# Patient Record
Sex: FEMALE | Race: WHITE | NOT HISPANIC OR LATINO | Employment: FULL TIME | URBAN - METROPOLITAN AREA
[De-identification: names, ages, dates, MRNs, and addresses within clinical notes are randomized per-mention and may not be internally consistent; named-entity substitution may affect disease eponyms.]

---

## 2023-09-18 ENCOUNTER — OFFICE VISIT (OUTPATIENT)
Dept: URGENT CARE | Facility: CLINIC | Age: 38
End: 2023-09-18
Payer: COMMERCIAL

## 2023-09-18 VITALS
OXYGEN SATURATION: 99 % | RESPIRATION RATE: 20 BRPM | HEART RATE: 89 BPM | SYSTOLIC BLOOD PRESSURE: 130 MMHG | WEIGHT: 144 LBS | HEIGHT: 65 IN | BODY MASS INDEX: 23.99 KG/M2 | TEMPERATURE: 98 F | DIASTOLIC BLOOD PRESSURE: 80 MMHG

## 2023-09-18 DIAGNOSIS — J06.9 UPPER RESPIRATORY VIRUS: Primary | ICD-10-CM

## 2023-09-18 DIAGNOSIS — J02.9 SORETHROAT: ICD-10-CM

## 2023-09-18 LAB
CTP QC/QA: YES
CTP QC/QA: YES
MOLECULAR STREP A: NEGATIVE
SARS-COV-2 AG RESP QL IA.RAPID: NEGATIVE

## 2023-09-18 PROCEDURE — 87651 STREP A DNA AMP PROBE: CPT | Mod: QW,S$GLB,, | Performed by: NURSE PRACTITIONER

## 2023-09-18 PROCEDURE — 87811 SARS-COV-2 COVID19 W/OPTIC: CPT | Mod: QW,S$GLB,, | Performed by: NURSE PRACTITIONER

## 2023-09-18 PROCEDURE — 99203 PR OFFICE/OUTPT VISIT, NEW, LEVL III, 30-44 MIN: ICD-10-PCS | Mod: S$GLB,,, | Performed by: NURSE PRACTITIONER

## 2023-09-18 PROCEDURE — 87651 POCT STREP A MOLECULAR: ICD-10-PCS | Mod: QW,S$GLB,, | Performed by: NURSE PRACTITIONER

## 2023-09-18 PROCEDURE — 99203 OFFICE O/P NEW LOW 30 MIN: CPT | Mod: S$GLB,,, | Performed by: NURSE PRACTITIONER

## 2023-09-18 PROCEDURE — 87811 SARS CORONAVIRUS 2 ANTIGEN POCT, MANUAL READ: ICD-10-PCS | Mod: QW,S$GLB,, | Performed by: NURSE PRACTITIONER

## 2023-09-18 NOTE — PROGRESS NOTES
"Subjective:      Patient ID: Josephine Cruz is a 38 y.o. female.    Vitals:  height is 5' 5" (1.651 m) and weight is 65.3 kg (144 lb). Her oral temperature is 98 °F (36.7 °C). Her blood pressure is 130/80 and her pulse is 89. Her respiration is 20 and oxygen saturation is 99%.     Chief Complaint: Sore Throat    This is a 38 y.o. female who presents today with a chief complaint of  headaches, sore throat, earaches, stomach upset, sneezing, congestion ,fever 101.2.symptoms started today denies body aches or chills, denies cough, wheezing or shortness of breath, denies nausea, vomiting, diarrhea or abdominal pain, denies chest pain or dizziness positional lightheadedness, denies trouble swallowing, denies loss of taste or smell, or any other symptoms         Sinus Problem  This is a new problem. The current episode started today. The problem has been gradually worsening since onset. The maximum temperature recorded prior to her arrival was 101 - 101.9 F. The fever has been present for Less than 1 day. Her pain is at a severity of 3/10. The pain is mild. Associated symptoms include congestion, ear pain, headaches, sinus pressure, sneezing, a sore throat and swollen glands. Pertinent negatives include no chills or coughing. Past treatments include nothing. The treatment provided no relief.       Constitution: Positive for fever. Negative for chills.   HENT:  Positive for ear pain, congestion, sinus pressure and sore throat.    Respiratory:  Negative for cough.    Allergic/Immunologic: Positive for sneezing.   Neurological:  Positive for headaches.      Objective:     Physical Exam   Constitutional: She is oriented to person, place, and time. She appears well-developed. She is cooperative.  Non-toxic appearance. She does not appear ill. No distress.   HENT:   Head: Normocephalic and atraumatic.   Ears:   Right Ear: Hearing, tympanic membrane, external ear and ear canal normal.   Left Ear: Hearing, tympanic membrane, " external ear and ear canal normal.   Nose: Nose normal. No mucosal edema, rhinorrhea or nasal deformity. No epistaxis. Right sinus exhibits no maxillary sinus tenderness and no frontal sinus tenderness. Left sinus exhibits no maxillary sinus tenderness and no frontal sinus tenderness.   Mouth/Throat: Uvula is midline, oropharynx is clear and moist and mucous membranes are normal. No trismus in the jaw. Normal dentition. No uvula swelling. No oropharyngeal exudate, posterior oropharyngeal edema, posterior oropharyngeal erythema, tonsillar abscesses or cobblestoning.   Eyes: Conjunctivae and lids are normal. No scleral icterus.   Neck: Trachea normal and phonation normal. Neck supple. No edema present. No erythema present. No neck rigidity present.   Cardiovascular: Normal rate, regular rhythm, normal heart sounds and normal pulses.   Pulmonary/Chest: Effort normal and breath sounds normal. No respiratory distress. She has no decreased breath sounds. She has no rhonchi.   Abdominal: Normal appearance.   Musculoskeletal: Normal range of motion.         General: No deformity. Normal range of motion.   Lymphadenopathy:     She has no cervical adenopathy.   Neurological: She is alert and oriented to person, place, and time. She exhibits normal muscle tone. Coordination normal.   Skin: Skin is warm, dry, intact, not diaphoretic and not pale.   Psychiatric: Her speech is normal and behavior is normal. Judgment and thought content normal.   Nursing note and vitals reviewed.    Results for orders placed or performed in visit on 09/18/23   SARS Coronavirus 2 Antigen, POCT Manual Read   Result Value Ref Range    SARS Coronavirus 2 Antigen Negative Negative     Acceptable Yes    POCT Strep A, Molecular   Result Value Ref Range    Molecular Strep A, POC Negative Negative     Acceptable Yes          Patient in no acute distress.  Vitals reassuring.  Discussed results/diagnosis/plan in depth with  patient in clinic. Strict precautions given to patient to monitor for worsening signs and symptoms. Advised to follow up with primary.All questions answered. Strict ER precautions given. If your symptoms worsens or fail to improve you should go to the Emergency Room. Discharge and follow-up instructions given verbally/printed. Discharge and follow-up instructions discussed with the patient who expressed understanding and willingness to comply with my recommendations.Patient voiced understanding and in agreement with current treatment plan.     Please be advised this text was dictated with [employer] software and may contain errors due to translation.    Assessment:     1. Upper respiratory virus    2. Sorethroat        Plan:       Upper respiratory virus    Sorethroat  -     SARS Coronavirus 2 Antigen, POCT Manual Read  -     POCT Strep A, Molecular    Other orders  -     (Magic mouthwash) 1:1:1 diphenhydrAMINE(Benadryl) 12.5mg/5ml liq, aluminum & magnesium hydroxide-simethicone (Maalox), LIDOcaine viscous 2%; Swish and spit 10 mLs every 4 (four) hours as needed (sore throat). for sore throat  Dispense: 90 mL; Refill: 0                  Patient Instructions   PLEASE READ YOUR DISCHARGE INSTRUCTIONS ENTIRELY AS IT CONTAINS IMPORTANT INFORMATION.      Please drink plenty of fluids.    Please get plenty of rest.    Please return here or go to the Emergency Department for any concerns or worsening of condition.    Please take an over the counter antihistamine medication (allegra/Claritin/Zyrtec) of your choice as directed.    Try an over the counter decongestant like Mucinex D or Sudafed. You buy this behind the pharmacy counter    If you do have Hypertension or palpitations, it is safe to take Coricidin HBP for relief of sinus symptoms.    If not allergic, please take over the counter Tylenol (Acetaminophen) and/or Motrin (Ibuprofen) as directed for control of pain and/or fever.  Please follow up with your primary care  doctor or specialist as needed.    Sore throat recommendations: Warm fluids, warm salt water gargles, throat lozenges, tea, honey, soup, rest, hydration.    Use over the counter flonase: one spray each nostril twice daily OR two sprays each nostril once daily.     If you  smoke, please stop smoking.      Please return or see your primary care doctor if you develop new or worsening symptoms.     Please arrange follow up with your primary medical clinic as soon as possible. You must understand that you've received an Urgent Care treatment only and that you may be released before all of your medical problems are known or treated. You, the patient, will arrange for follow up as instructed. If your symptoms worsen or fail to improve you should go to the Emergency Room.

## 2024-06-18 ENCOUNTER — OFFICE VISIT (OUTPATIENT)
Dept: INTERNAL MEDICINE | Facility: CLINIC | Age: 39
End: 2024-06-18
Payer: COMMERCIAL

## 2024-06-18 ENCOUNTER — TELEPHONE (OUTPATIENT)
Dept: ORTHOPEDICS | Facility: CLINIC | Age: 39
End: 2024-06-18
Payer: COMMERCIAL

## 2024-06-18 ENCOUNTER — LAB VISIT (OUTPATIENT)
Dept: LAB | Facility: HOSPITAL | Age: 39
End: 2024-06-18
Payer: COMMERCIAL

## 2024-06-18 VITALS
DIASTOLIC BLOOD PRESSURE: 80 MMHG | SYSTOLIC BLOOD PRESSURE: 114 MMHG | OXYGEN SATURATION: 97 % | BODY MASS INDEX: 24.68 KG/M2 | WEIGHT: 148.13 LBS | HEIGHT: 65 IN | HEART RATE: 72 BPM

## 2024-06-18 DIAGNOSIS — Z87.898 HISTORY OF BREAST LUMP: ICD-10-CM

## 2024-06-18 DIAGNOSIS — H81.20 VESTIBULAR NEURITIS, UNSPECIFIED LATERALITY: ICD-10-CM

## 2024-06-18 DIAGNOSIS — Z85.41 HISTORY OF CERVICAL CANCER: ICD-10-CM

## 2024-06-18 DIAGNOSIS — M51.36 DDD (DEGENERATIVE DISC DISEASE), LUMBAR: Primary | ICD-10-CM

## 2024-06-18 DIAGNOSIS — M54.16 LUMBAR RADICULOPATHY, CHRONIC: ICD-10-CM

## 2024-06-18 DIAGNOSIS — M54.50 LUMBAR PAIN: ICD-10-CM

## 2024-06-18 DIAGNOSIS — Z00.00 ANNUAL PHYSICAL EXAM: Primary | ICD-10-CM

## 2024-06-18 DIAGNOSIS — M54.9 DORSALGIA, UNSPECIFIED: ICD-10-CM

## 2024-06-18 DIAGNOSIS — D22.9 MULTIPLE ATYPICAL SKIN MOLES: ICD-10-CM

## 2024-06-18 DIAGNOSIS — Z00.00 ANNUAL PHYSICAL EXAM: ICD-10-CM

## 2024-06-18 LAB
ALBUMIN SERPL BCP-MCNC: 4.2 G/DL (ref 3.5–5.2)
ALP SERPL-CCNC: 42 U/L (ref 55–135)
ALT SERPL W/O P-5'-P-CCNC: 11 U/L (ref 10–44)
ANION GAP SERPL CALC-SCNC: 9 MMOL/L (ref 8–16)
AST SERPL-CCNC: 16 U/L (ref 10–40)
BASOPHILS # BLD AUTO: 0.05 K/UL (ref 0–0.2)
BASOPHILS NFR BLD: 0.6 % (ref 0–1.9)
BILIRUB SERPL-MCNC: 0.4 MG/DL (ref 0.1–1)
BUN SERPL-MCNC: 17 MG/DL (ref 6–20)
CALCIUM SERPL-MCNC: 9.2 MG/DL (ref 8.7–10.5)
CHLORIDE SERPL-SCNC: 105 MMOL/L (ref 95–110)
CHOLEST SERPL-MCNC: 178 MG/DL (ref 120–199)
CHOLEST/HDLC SERPL: 3.1 {RATIO} (ref 2–5)
CO2 SERPL-SCNC: 23 MMOL/L (ref 23–29)
CREAT SERPL-MCNC: 0.8 MG/DL (ref 0.5–1.4)
DIFFERENTIAL METHOD BLD: ABNORMAL
EOSINOPHIL # BLD AUTO: 0.4 K/UL (ref 0–0.5)
EOSINOPHIL NFR BLD: 4.4 % (ref 0–8)
ERYTHROCYTE [DISTWIDTH] IN BLOOD BY AUTOMATED COUNT: 12.5 % (ref 11.5–14.5)
EST. GFR  (NO RACE VARIABLE): >60 ML/MIN/1.73 M^2
ESTIMATED AVG GLUCOSE: 97 MG/DL (ref 68–131)
GLUCOSE SERPL-MCNC: 88 MG/DL (ref 70–110)
HBA1C MFR BLD: 5 % (ref 4–5.6)
HCT VFR BLD AUTO: 41.4 % (ref 37–48.5)
HCV AB SERPL QL IA: NORMAL
HDLC SERPL-MCNC: 58 MG/DL (ref 40–75)
HDLC SERPL: 32.6 % (ref 20–50)
HGB BLD-MCNC: 13.5 G/DL (ref 12–16)
HIV 1+2 AB+HIV1 P24 AG SERPL QL IA: NORMAL
IMM GRANULOCYTES # BLD AUTO: 0.03 K/UL (ref 0–0.04)
IMM GRANULOCYTES NFR BLD AUTO: 0.3 % (ref 0–0.5)
LDLC SERPL CALC-MCNC: 106.8 MG/DL (ref 63–159)
LYMPHOCYTES # BLD AUTO: 1.9 K/UL (ref 1–4.8)
LYMPHOCYTES NFR BLD: 21.2 % (ref 18–48)
MCH RBC QN AUTO: 31.5 PG (ref 27–31)
MCHC RBC AUTO-ENTMCNC: 32.6 G/DL (ref 32–36)
MCV RBC AUTO: 97 FL (ref 82–98)
MONOCYTES # BLD AUTO: 0.9 K/UL (ref 0.3–1)
MONOCYTES NFR BLD: 10 % (ref 4–15)
NEUTROPHILS # BLD AUTO: 5.7 K/UL (ref 1.8–7.7)
NEUTROPHILS NFR BLD: 63.5 % (ref 38–73)
NONHDLC SERPL-MCNC: 120 MG/DL
NRBC BLD-RTO: 0 /100 WBC
PLATELET # BLD AUTO: 275 K/UL (ref 150–450)
PMV BLD AUTO: 11.5 FL (ref 9.2–12.9)
POTASSIUM SERPL-SCNC: 4.7 MMOL/L (ref 3.5–5.1)
PROT SERPL-MCNC: 7.4 G/DL (ref 6–8.4)
RBC # BLD AUTO: 4.29 M/UL (ref 4–5.4)
SODIUM SERPL-SCNC: 137 MMOL/L (ref 136–145)
TRIGL SERPL-MCNC: 66 MG/DL (ref 30–150)
TSH SERPL DL<=0.005 MIU/L-ACNC: 1.39 UIU/ML (ref 0.4–4)
WBC # BLD AUTO: 8.93 K/UL (ref 3.9–12.7)

## 2024-06-18 PROCEDURE — 80053 COMPREHEN METABOLIC PANEL: CPT | Performed by: PHYSICIAN ASSISTANT

## 2024-06-18 PROCEDURE — 36415 COLL VENOUS BLD VENIPUNCTURE: CPT | Performed by: PHYSICIAN ASSISTANT

## 2024-06-18 PROCEDURE — 3008F BODY MASS INDEX DOCD: CPT | Mod: CPTII,S$GLB,, | Performed by: PHYSICIAN ASSISTANT

## 2024-06-18 PROCEDURE — 99999 PR PBB SHADOW E&M-EST. PATIENT-LVL V: CPT | Mod: PBBFAC,,, | Performed by: PHYSICIAN ASSISTANT

## 2024-06-18 PROCEDURE — 90715 TDAP VACCINE 7 YRS/> IM: CPT | Mod: S$GLB,,, | Performed by: PHYSICIAN ASSISTANT

## 2024-06-18 PROCEDURE — 83036 HEMOGLOBIN GLYCOSYLATED A1C: CPT | Performed by: PHYSICIAN ASSISTANT

## 2024-06-18 PROCEDURE — 3074F SYST BP LT 130 MM HG: CPT | Mod: CPTII,S$GLB,, | Performed by: PHYSICIAN ASSISTANT

## 2024-06-18 PROCEDURE — 3079F DIAST BP 80-89 MM HG: CPT | Mod: CPTII,S$GLB,, | Performed by: PHYSICIAN ASSISTANT

## 2024-06-18 PROCEDURE — 99385 PREV VISIT NEW AGE 18-39: CPT | Mod: 25,S$GLB,, | Performed by: PHYSICIAN ASSISTANT

## 2024-06-18 PROCEDURE — 84443 ASSAY THYROID STIM HORMONE: CPT | Performed by: PHYSICIAN ASSISTANT

## 2024-06-18 PROCEDURE — 80061 LIPID PANEL: CPT | Performed by: PHYSICIAN ASSISTANT

## 2024-06-18 PROCEDURE — 90471 IMMUNIZATION ADMIN: CPT | Mod: S$GLB,,, | Performed by: PHYSICIAN ASSISTANT

## 2024-06-18 PROCEDURE — 1159F MED LIST DOCD IN RCRD: CPT | Mod: CPTII,S$GLB,, | Performed by: PHYSICIAN ASSISTANT

## 2024-06-18 PROCEDURE — 85025 COMPLETE CBC W/AUTO DIFF WBC: CPT | Performed by: PHYSICIAN ASSISTANT

## 2024-06-18 PROCEDURE — 87389 HIV-1 AG W/HIV-1&-2 AB AG IA: CPT | Performed by: PHYSICIAN ASSISTANT

## 2024-06-18 PROCEDURE — 86803 HEPATITIS C AB TEST: CPT | Performed by: PHYSICIAN ASSISTANT

## 2024-06-18 PROCEDURE — 3044F HG A1C LEVEL LT 7.0%: CPT | Mod: CPTII,S$GLB,, | Performed by: PHYSICIAN ASSISTANT

## 2024-06-18 RX ORDER — GABAPENTIN 100 MG/1
100 CAPSULE ORAL 3 TIMES DAILY
Qty: 90 CAPSULE | Refills: 11 | Status: SHIPPED | OUTPATIENT
Start: 2024-06-18 | End: 2025-06-18

## 2024-06-18 RX ORDER — MELOXICAM 15 MG/1
15 TABLET ORAL DAILY
Qty: 14 TABLET | Refills: 0 | Status: SHIPPED | OUTPATIENT
Start: 2024-06-18 | End: 2024-07-02

## 2024-06-18 RX ORDER — MECLIZINE HYDROCHLORIDE 25 MG/1
25 TABLET ORAL 3 TIMES DAILY PRN
Qty: 30 TABLET | Refills: 1 | Status: SHIPPED | OUTPATIENT
Start: 2024-06-18

## 2024-06-18 NOTE — PROGRESS NOTES
Internal Medicine Annual Exam       CHIEF COMPLAINT     The patient, Josephine Cruz, who is a 39 y.o. female presents for an annual exam.    HPI     PCP is No, Primary Doctor, patient is new to me.     She presents for annual exam, she will meet with Dr. Alvarez later this year to establish care.      Has chronic lower back pain - worse in the past few weeks   Has x-ray from 10/31/2022 that shows degenerative changes and limbus vertebrae -needs to establish with ortho spine and pain mgmt here at [facility]. She reports that for the past 2-3 weeks she has been in a lot of pain. Pain worse with sitting for long periods and with changing positions. She denies bladder or bowel incontinence. She has no history of back or spin surgery. However, she has had evals with spine surgery in the past.     She has vestibular neuritis that she manages with PRN meds as follows: Meclizine, Klonopin nightly, Meloxicam  Ibuprofen 800 mg. She admits that she was unaware that she should not take ibuprofen and meloxicam together.      History of cerivcal ca - s/p leep and conization with clearance documented - follows with [facility]YN - Dr. Joseph, her recently screenings are clear.     History of Left breast bx- benign remote, will start annual mammograms next year at age 40.     She reports multiple moles that she wound like to have a dermatologist evaluate. She is requesting derm referral.     Family history:   Maternal GM -breast and colon Ca   Maternal GF - CABG with CAD   Mother with arthritis - osteoarthritis, carrier for muscular dystrophy         Past Medical History:  No past medical history on file.    No past surgical history on file.     No family history on file.     Social History     Socioeconomic History    Marital status:    Tobacco Use    Smoking status: Never    Smokeless tobacco: Never     Social Determinants of Health     Financial Resource Strain: Medium Risk (6/17/2024)    Overall Financial Resource Strain  (CARDIA)     Difficulty of Paying Living Expenses: Somewhat hard   Food Insecurity: No Food Insecurity (6/17/2024)    Hunger Vital Sign     Worried About Running Out of Food in the Last Year: Never true     Ran Out of Food in the Last Year: Never true   Physical Activity: Sufficiently Active (6/17/2024)    Exercise Vital Sign     Days of Exercise per Week: 5 days     Minutes of Exercise per Session: 40 min   Stress: Stress Concern Present (6/17/2024)    [place] citizen [place] [employer] - Occupational Stress Questionnaire     Feeling of Stress : Very much   Housing Stability: Unknown (6/17/2024)    Housing Stability Vital Sign     Unable to Pay for Housing in the Last Year: No        Social History     Tobacco Use   Smoking Status Never   Smokeless Tobacco Never        Allergies as of 06/18/2024    (No Known Allergies)          Home Medications:  Prior to Admission medications    Medication Sig Start Date End Date Taking? Authorizing Provider   (Magic mouthwash) 1:1:1 diphenhydrAMINE(Benadryl) 12.5mg/5ml liq, aluminum & magnesium hydroxide-simethicone (Maalox), LIDOcaine viscous 2% Swish and spit 10 mLs every 4 (four) hours as needed (sore throat). for sore throat 9/18/23   Rathor, Qurratulain, NP       Review of Systems:  Review of Systems   Constitutional:  Negative for chills and fever.   HENT:  Negative for sore throat and trouble swallowing.    Eyes:  Negative for visual disturbance.   Respiratory:  Negative for cough and shortness of breath.    Cardiovascular:  Negative for chest pain.   Gastrointestinal:  Negative for abdominal pain, constipation, diarrhea, nausea and vomiting.   Genitourinary:  Negative for dysuria and flank pain.   Musculoskeletal:  Positive for back pain. Negative for neck pain and neck stiffness.   Skin:  Negative for rash.   Neurological:  Negative for dizziness, syncope, weakness and headaches.   Psychiatric/Behavioral:  Negative for confusion.        Health Maintainence:  "  Immunizations:  Health Maintenance         Date Due Completion Date    Hepatitis C Screening Never done ---    Cervical Cancer Screening Never done ---    Lipid Panel Never done ---    HIV Screening Never done ---    TETANUS VACCINE Never done ---    COVID-19 Vaccine (1 - 2023-24 season) Never done ---    Influenza Vaccine (Season Ended) 09/01/2024 ---             PHYSICAL EXAM     /80   Pulse 72   Ht 5' 5" (1.651 m)   Wt 67.2 kg (148 lb 2.4 oz)   SpO2 97%   BMI 24.65 kg/m²  Body mass index is 24.65 kg/m².    Physical Exam  Vitals and nursing note reviewed.   Constitutional:       Appearance: Normal appearance.      Comments: Healthy appearing female in NAD or apparent pain. She makes good eye contact, speaks in clear full sentences and ambulates with ease.          HENT:      Head: Normocephalic and atraumatic.      Nose: Nose normal.      Mouth/Throat:      Pharynx: Oropharynx is clear.   Eyes:      Conjunctiva/sclera: Conjunctivae normal.   Cardiovascular:      Rate and Rhythm: Normal rate and regular rhythm.      Pulses: Normal pulses.   Pulmonary:      Effort: No respiratory distress.   Abdominal:      Tenderness: There is no abdominal tenderness.   Musculoskeletal:         General: Normal range of motion.      Cervical back: No rigidity.      Comments: No C T or L midline bony TTP crepitus or step-offs    Skin:     General: Skin is warm and dry.      Capillary Refill: Capillary refill takes less than 2 seconds.      Findings: No rash.   Neurological:      General: No focal deficit present.      Mental Status: She is alert.      Gait: Gait normal.   Psychiatric:         Mood and Affect: Mood normal.         LABS     No results found for: "LABA1C", "HGBA1C"  CMP  No results found for: "NA", "K", "CL", "CO2", "GLU", "BUN", "CREATININE", "CALCIUM", "PROT", "ALBUMIN", "BILITOT", "ALKPHOS", "AST", "ALT", "ANIONGAP", "ESTGFRAFRICA", "EGFRNONAA"  No results found for: "WBC", "HGB", "HCT", "MCV", "PLT"  No " "results found for: "CHOL"  No results found for: "HDL"  No results found for: "LDLCALC"  No results found for: "TRIG"  No results found for: "CHOLHDL"  No results found for: "TSH", "R8WQJNP", "C9CINVT", "THYROIDAB"    ASSESSMENT/PLAN     Josephine Cruz is a 39 y.o. female    Josephine was seen today for annual exam and back pain. Will get annual labs and will also refer to PT, ortho and pain clinic. Will get order Lumbar MRI as recommended on outside imaging. Recommend start trial of gabapentin. Low threshold to start steroid dose pack for refractory pain.     Diagnoses and all orders for this visit:    Annual physical exam  -     CBC Auto Differential; Future  -     Comprehensive Metabolic Panel; Future  -     Hemoglobin A1C; Future  -     Lipid Panel; Future  -     TSH; Future  -     HIV 1/2 Ag/Ab (4th Gen); Future  -     HEPATITIS C ANTIBODY; Future    Lumbar pain  -     Ambulatory referral/consult to Physical/Occupational Therapy; Future  -     Ambulatory referral/consult to Pain Clinic; Future  -     Ambulatory referral/consult to Orthopedics; Future    Dorsalgia, unspecified  -     Ambulatory referral/consult to Physical/Occupational Therapy; Future  -     Ambulatory referral/consult to Pain Clinic; Future  -     Ambulatory referral/consult to Orthopedics; Future  -     MRI Lumbar Spine Without Contrast; Future    Lumbar radiculopathy, chronic  -     Ambulatory referral/consult to Physical/Occupational Therapy; Future  -     Ambulatory referral/consult to Pain Clinic; Future  -     Ambulatory referral/consult to Orthopedics; Future  -     MRI Lumbar Spine Without Contrast; Future    Multiple atypical skin moles  -     Ambulatory referral/consult to Dermatology; Future    History of cervical cancer    History of breast lump    Vestibular neuritis, unspecified laterality    Other orders  -     gabapentin (NEURONTIN) 100 MG capsule; Take 1 capsule (100 mg total) by mouth 3 (three) times daily.  -     meclizine " (ANTIVERT) 25 mg tablet; Take 1 tablet (25 mg total) by mouth 3 (three) times daily as needed for Dizziness.  -     meloxicam (MOBIC) 15 MG tablet; Take 1 tablet (15 mg total) by mouth once daily. for 14 days  -     Tdap (BOOSTRIX) vaccine injection 0.5 mL      Tabatha Greene PA-C  Department of Internal Medicine - [facility]   11:05 AM

## 2024-06-18 NOTE — TELEPHONE ENCOUNTER
I called patient twice to speak to her. I did not leave a message the first time I left a message on the second attempt. I will cancel her appointment for today and schedule it for after she gets her MRI. Please call when you get this message.

## 2024-06-25 ENCOUNTER — TELEPHONE (OUTPATIENT)
Dept: SPINE | Facility: CLINIC | Age: 39
End: 2024-06-25
Payer: COMMERCIAL

## 2024-06-27 ENCOUNTER — HOSPITAL ENCOUNTER (OUTPATIENT)
Dept: RADIOLOGY | Facility: OTHER | Age: 39
Discharge: HOME | End: 2024-06-27
Attending: STUDENT IN AN ORGANIZED HEALTH CARE EDUCATION/TRAINING PROGRAM
Payer: COMMERCIAL

## 2024-06-27 ENCOUNTER — OFFICE VISIT (OUTPATIENT)
Dept: SPINE | Facility: CLINIC | Age: 39
End: 2024-06-27
Payer: COMMERCIAL

## 2024-06-27 ENCOUNTER — PATIENT MESSAGE (OUTPATIENT)
Dept: INTERNAL MEDICINE | Facility: CLINIC | Age: 39
End: 2024-06-27
Payer: COMMERCIAL

## 2024-06-27 VITALS
RESPIRATION RATE: 12 BRPM | HEART RATE: 77 BPM | SYSTOLIC BLOOD PRESSURE: 126 MMHG | WEIGHT: 145.81 LBS | OXYGEN SATURATION: 100 % | DIASTOLIC BLOOD PRESSURE: 76 MMHG | BODY MASS INDEX: 24.29 KG/M2 | HEIGHT: 65 IN

## 2024-06-27 DIAGNOSIS — M54.16 LUMBAR RADICULOPATHY, CHRONIC: ICD-10-CM

## 2024-06-27 DIAGNOSIS — M54.2 NECK PAIN, CHRONIC: ICD-10-CM

## 2024-06-27 DIAGNOSIS — G89.29 NECK PAIN, CHRONIC: Primary | ICD-10-CM

## 2024-06-27 DIAGNOSIS — G89.29 NECK PAIN, CHRONIC: ICD-10-CM

## 2024-06-27 DIAGNOSIS — M54.50 LUMBAR PAIN: ICD-10-CM

## 2024-06-27 DIAGNOSIS — M54.2 NECK PAIN, CHRONIC: Primary | ICD-10-CM

## 2024-06-27 DIAGNOSIS — M54.9 DORSALGIA, UNSPECIFIED: ICD-10-CM

## 2024-06-27 PROCEDURE — 3008F BODY MASS INDEX DOCD: CPT | Mod: CPTII,S$GLB,, | Performed by: STUDENT IN AN ORGANIZED HEALTH CARE EDUCATION/TRAINING PROGRAM

## 2024-06-27 PROCEDURE — 72040 X-RAY EXAM NECK SPINE 2-3 VW: CPT | Mod: TC,FY

## 2024-06-27 PROCEDURE — 3074F SYST BP LT 130 MM HG: CPT | Mod: CPTII,S$GLB,, | Performed by: STUDENT IN AN ORGANIZED HEALTH CARE EDUCATION/TRAINING PROGRAM

## 2024-06-27 PROCEDURE — 3078F DIAST BP <80 MM HG: CPT | Mod: CPTII,S$GLB,, | Performed by: STUDENT IN AN ORGANIZED HEALTH CARE EDUCATION/TRAINING PROGRAM

## 2024-06-27 PROCEDURE — 1160F RVW MEDS BY RX/DR IN RCRD: CPT | Mod: CPTII,S$GLB,, | Performed by: STUDENT IN AN ORGANIZED HEALTH CARE EDUCATION/TRAINING PROGRAM

## 2024-06-27 PROCEDURE — 3044F HG A1C LEVEL LT 7.0%: CPT | Mod: CPTII,S$GLB,, | Performed by: STUDENT IN AN ORGANIZED HEALTH CARE EDUCATION/TRAINING PROGRAM

## 2024-06-27 PROCEDURE — 99999 PR PBB SHADOW E&M-EST. PATIENT-LVL IV: CPT | Mod: PBBFAC,,, | Performed by: STUDENT IN AN ORGANIZED HEALTH CARE EDUCATION/TRAINING PROGRAM

## 2024-06-27 PROCEDURE — 99204 OFFICE O/P NEW MOD 45 MIN: CPT | Mod: S$GLB,,, | Performed by: STUDENT IN AN ORGANIZED HEALTH CARE EDUCATION/TRAINING PROGRAM

## 2024-06-27 PROCEDURE — 72040 X-RAY EXAM NECK SPINE 2-3 VW: CPT | Mod: 26,,, | Performed by: RADIOLOGY

## 2024-06-27 PROCEDURE — 1159F MED LIST DOCD IN RCRD: CPT | Mod: CPTII,S$GLB,, | Performed by: STUDENT IN AN ORGANIZED HEALTH CARE EDUCATION/TRAINING PROGRAM

## 2024-06-27 NOTE — PROGRESS NOTES
Chronic Pain - New Consult    Referring Physician: Tabatha Greene, JOSE    Chief Complaint:   Chief Complaint   Patient presents with    Back Pain    Low-back Pain          SUBJECTIVE:    Josephine Cruz presents to the clinic for the evaluation of lower back and bilateral lower extremity pain. The pain started over 10 years ago following no inciting event and symptoms have been worsening.The pain is located in the lower back area and radiates to the bilateral feet (prior sciatica, now just tingling calves/feet).  The pain is described as  solid, aching, occasionally sharp (causes her to use a cane)  and is rated as 2/10. The pain is rated with a score of  2/10 on the BEST day and a score of 9/10 on the WORST day.  Symptoms interfere with daily activity and sleeping. The pain is exacerbated by Coughing/Sneezing, Getting out of bed/chair, and kids coming to hug.  The pain is mitigated by inversion table (hurts at first, but alleviates pain for about 20 minutes). The patient reports 4 hours of uninterrupted sleep per night.    Patient denies urinary incontinence, bowel incontinence, and significant motor weakness.  She admits to vestibular neuritis (vertigo worsens with pain).  She also reports neck pain, but feels the back pain is worse.    Physical Therapy/Home Exercise: yes, saw PT 3 years ago.      Pain Disability Index Review:      6/27/2024     1:08 PM   Last 3 PDI Scores   Pain Disability Index (PDI) 42       Pain Medications:   - gabapentin (just started a week ago, but hasn't taken for several days)   - meloxicam (not taking as she's had good days)     report:  Reviewed and consistent with medication use as prescribed.    Pain Procedures:   None    Imaging:   X-ray, Lumbosacral, 2 or 3 Views  CLINICAL INDICATION  Back pain  COMPARISON  No relevant imaging examinations are available for review.  PROCEDURE DETAILS  AP, lateral and lumbosacral views  FINDINGS  The lumbar spine demonstrates normal  alignment and vertebral body height.  No fracture or  dislocation. Multilevel degenerative changes of the spine are present with degenerative disc  disease, marginal osteophytes and facet joint arthropathy, most apparent at L4-L5, L5-S1. Limbus  vertebrae favored over avulsion fracture at the anterior superior aspect of the L5 of vertebral  margin with corticated wedge-shaped ossific density.  Dextroscoliosis. The soft tissues are normal.  IMPRESSION  1.  Limbus vertebrae favored over avulsion fracture at the anterior superior margin of the L5  vertebral body. MRI lumbar spine would be helpful for further evaluation confirmation.  2.  Degenerative changes lumbar spine  Signature  Electronically Signed:   Jackie Dobbins M.D. on 10-, 02:04 PM  Jackie Dobbins  10- 02:04 PM    No past medical history on file.  History reviewed. No pertinent surgical history.  Social History     Socioeconomic History    Marital status:    Tobacco Use    Smoking status: Never    Smokeless tobacco: Never     Social Determinants of Health     Financial Resource Strain: Medium Risk (6/17/2024)    Overall Financial Resource Strain (CARDIA)     Difficulty of Paying Living Expenses: Somewhat hard   Food Insecurity: No Food Insecurity (6/17/2024)    Hunger Vital Sign     Worried About Running Out of Food in the Last Year: Never true     Ran Out of Food in the Last Year: Never true   Physical Activity: Sufficiently Active (6/17/2024)    Exercise Vital Sign     Days of Exercise per Week: 5 days     Minutes of Exercise per Session: 40 min   Stress: Stress Concern Present (6/17/2024)    [employer] - Occupational Stress Questionnaire     Feeling of Stress : Very much   Housing Stability: Unknown (6/17/2024)    Housing Stability Vital Sign     Unable to Pay for Housing in the Last Year: No     No family history on file.    Review of patient's allergies indicates:  No Known Allergies    Current  "Outpatient Medications   Medication Sig    gabapentin (NEURONTIN) 100 MG capsule Take 1 capsule (100 mg total) by mouth 3 (three) times daily.    meclizine (ANTIVERT) 25 mg tablet Take 1 tablet (25 mg total) by mouth 3 (three) times daily as needed for Dizziness.    meloxicam (MOBIC) 15 MG tablet Take 1 tablet (15 mg total) by mouth once daily. for 14 days     No current facility-administered medications for this visit.       REVIEW OF SYSTEMS:    GENERAL:  current fevers or chills, recent use of antibiotics denies.  HEENT:  History of migraines/headaches: reports history of migraines, History of major throat surgery: denies  RESPIRATORY:  History of home oxygen or pulmonary hypertension/severe breathing dysfunction: denies  CARDIOVASCULAR:  Hx of palpitations/rhythm problems: denies Hx of Heart Attacks/Surgery: denies  GI:  Recent abdominal discomfort or recent change in bowel habits denies  MUSCULOSKELETAL:  See HPI.  SKIN:  unhealed wounds or rashes: denies  PSYCH: major psychiatric history or recent psychosocial stressors reports anxiety and periodic depression (3-4 days every few months)  HEMATOLOGY/LYMPHOLOGY:  Hx of prolonged bleeding, Hx of Blood thinner usage: denies  NEURO:   history of seizures, strokes, chronic/old weakness (such as paralysis or paresis of any body part): denies  All other reviewed and negative other than HPI.    OBJECTIVE:    /76 (BP Location: Left arm, Patient Position: Sitting, BP Method: Large (Automatic))   Pulse 77   Resp 12   Ht 5' 5" (1.651 m)   Wt 66.2 kg (145 lb 13.4 oz)   SpO2 100%   BMI 24.27 kg/m²     PHYSICAL EXAMINATION:  General appearance: Well appearing, in no acute distress, alert and appropriately communicative.  Psych:  Mood and affect appropriate.  Skin: Skin color, texture, turgor normal, no rashes or lesions, in both upper and lower body for exposed skin.  Head/face:  Atraumatic, normocephalic.  Neck: pain to palpation, pain to " flexion/extension/lateral bend  Cor: regular rate  Pulm: non-labored breathing  GI: Abdomen non-distended and non-tender.  Back: Straight leg raising in the sitting and supine positions is positive to radicular pain on the right. No pain to palpation over the spine or paraspinal muscles. Pain with flexion > extension, pain worse on the left. + [place]  Extremities: No deformities, significant lymphedema, or skin discoloration. No significant open wounds. No major amputations.  Musculoskeletal: hip, sacroiliac and knee provocative maneuvers are negative. Bilateral upper and lower extremity strength is normal and symmetric.  No atrophy or tone abnormalities are noted.  Neuro: Bilateral upper and lower extremity coordination and muscle stretch reflexes abnormalities noted: none.  Cross and/or Clonus: negative; loss of sensation to light touch: increased on the right lateral lower leg compared to left  Gait: normal    CMP  Sodium   Date Value Ref Range Status   06/18/2024 137 136 - 145 mmol/L Final     Potassium   Date Value Ref Range Status   06/18/2024 4.7 3.5 - 5.1 mmol/L Final     Chloride   Date Value Ref Range Status   06/18/2024 105 95 - 110 mmol/L Final     CO2   Date Value Ref Range Status   06/18/2024 23 23 - 29 mmol/L Final     Glucose   Date Value Ref Range Status   06/18/2024 88 70 - 110 mg/dL Final     BUN   Date Value Ref Range Status   06/18/2024 17 6 - 20 mg/dL Final     Creatinine   Date Value Ref Range Status   06/18/2024 0.8 0.5 - 1.4 mg/dL Final     Calcium   Date Value Ref Range Status   06/18/2024 9.2 8.7 - 10.5 mg/dL Final     Total Protein   Date Value Ref Range Status   06/18/2024 7.4 6.0 - 8.4 g/dL Final     Albumin   Date Value Ref Range Status   06/18/2024 4.2 3.5 - 5.2 g/dL Final     Total Bilirubin   Date Value Ref Range Status   06/18/2024 0.4 0.1 - 1.0 mg/dL Final     Comment:     For infants and newborns, interpretation of results should be based  on gestational age, weight and in  agreement with clinical  observations.    Premature Infant recommended reference ranges:  Up to 24 hours.............<8.0 mg/dL  Up to 48 hours............<12.0 mg/dL  3-5 days..................<15.0 mg/dL  6-29 days.................<15.0 mg/dL       Alkaline Phosphatase   Date Value Ref Range Status   06/18/2024 42 (L) 55 - 135 U/L Final     AST   Date Value Ref Range Status   06/18/2024 16 10 - 40 U/L Final     ALT   Date Value Ref Range Status   06/18/2024 11 10 - 44 U/L Final     Anion Gap   Date Value Ref Range Status   06/18/2024 9 8 - 16 mmol/L Final     eGFR   Date Value Ref Range Status   06/18/2024 >60.0 >60 mL/min/1.73 m^2 Final       ASSESSMENT: 39 y.o. year old female with chronic pain, consistent with:    1. Neck pain, chronic  X-Ray Cervical Spine 2 or 3 Views      2. Lumbar pain  Ambulatory referral/consult to Pain Clinic      3. Dorsalgia, unspecified  Ambulatory referral/consult to Pain Clinic      4. Lumbar radiculopathy, chronic  Ambulatory referral/consult to Pain Clinic        IMPRESSION: Josephine Cruz presents today for chronic lower back pain, as well as neck pain. History and physical exam are consistent with axial lower back pain/vertebrogenic low back pain.  My independent interpretation of the imaging is consistent with lumbar degenerative disc disease with lumbus vertebra at L5.  We will start with basic imaging and conservative management (physical therapy and non-narcotic medications). If their pain persists despite conservative measures, we will consider advanced imaging and/or interventions in an effort to give them additional relief for their pain.    PLAN:   - I have stressed the importance of physical activity and a home exercise plan to help with pain and improve health.  - Patient can continue with medications for now since they are providing benefits, using them appropriately, and without side effects.  - she is set to start physical therapy next week  - xray cervical  spine with flexion/extension  - ok to take tylenol 1000mg every 8 hours as needed; < 3000mg total in a day  - ok to use voltaren 1% topical for myofascial pain; do not use over open sores/wounds  - given [facility] spine conditioning and Claudia program (neck) handout; instructed to participate in regular home exercises at least 3 times a week for 15 minutes at a time.  They can do this while waiting for physical therapy to start.  - RTC in 2 - 3 months to discuss interventions, if applicable at that time. Of note, she may benefit from bilateral L5/S1 transforaminal epidural injection based on xrays and symptoms  - Counseled patient regarding the importance of activity modification and physical therapy.    The above plan and management options were discussed at length with patient. Patient is in agreement with the above and verbalized understanding. It will be communicated with the referring physician via electronic record, fax, or mail.    Irina Ann  06/27/2024

## 2024-06-28 NOTE — TELEPHONE ENCOUNTER
Pt inquiring if her upcoming MRI should be her entire spine rather than just lumbar due to recent finding from pain management Dr. Ann -- please advise and I will reach out to pt for update

## 2024-06-29 ENCOUNTER — HOSPITAL ENCOUNTER (OUTPATIENT)
Dept: RADIOLOGY | Facility: HOSPITAL | Age: 39
Discharge: HOME | End: 2024-06-29
Attending: PHYSICIAN ASSISTANT
Payer: COMMERCIAL

## 2024-06-29 DIAGNOSIS — M54.16 LUMBAR RADICULOPATHY, CHRONIC: ICD-10-CM

## 2024-06-29 DIAGNOSIS — M54.9 DORSALGIA, UNSPECIFIED: ICD-10-CM

## 2024-06-29 DIAGNOSIS — M51.36 DDD (DEGENERATIVE DISC DISEASE), LUMBAR: ICD-10-CM

## 2024-06-29 PROCEDURE — 72110 X-RAY EXAM L-2 SPINE 4/>VWS: CPT | Mod: 26,,, | Performed by: RADIOLOGY

## 2024-06-29 PROCEDURE — 72148 MRI LUMBAR SPINE W/O DYE: CPT | Mod: 26,,, | Performed by: RADIOLOGY

## 2024-06-29 PROCEDURE — 72148 MRI LUMBAR SPINE W/O DYE: CPT | Mod: TC

## 2024-06-29 PROCEDURE — 72110 X-RAY EXAM L-2 SPINE 4/>VWS: CPT | Mod: TC,FY

## 2024-07-01 ENCOUNTER — OFFICE VISIT (OUTPATIENT)
Dept: ORTHOPEDICS | Facility: CLINIC | Age: 39
End: 2024-07-01
Payer: COMMERCIAL

## 2024-07-01 VITALS — HEIGHT: 65 IN | BODY MASS INDEX: 24.96 KG/M2 | WEIGHT: 149.81 LBS

## 2024-07-01 DIAGNOSIS — M54.16 LUMBAR RADICULOPATHY: Primary | ICD-10-CM

## 2024-07-01 PROCEDURE — 99213 OFFICE O/P EST LOW 20 MIN: CPT | Mod: S$GLB,,, | Performed by: ORTHOPAEDIC SURGERY

## 2024-07-01 PROCEDURE — 1159F MED LIST DOCD IN RCRD: CPT | Mod: CPTII,S$GLB,, | Performed by: ORTHOPAEDIC SURGERY

## 2024-07-01 PROCEDURE — 3008F BODY MASS INDEX DOCD: CPT | Mod: CPTII,S$GLB,, | Performed by: ORTHOPAEDIC SURGERY

## 2024-07-01 PROCEDURE — 99999 PR PBB SHADOW E&M-EST. PATIENT-LVL II: CPT | Mod: PBBFAC,,, | Performed by: ORTHOPAEDIC SURGERY

## 2024-07-01 PROCEDURE — 3044F HG A1C LEVEL LT 7.0%: CPT | Mod: CPTII,S$GLB,, | Performed by: ORTHOPAEDIC SURGERY

## 2024-07-03 ENCOUNTER — PATIENT MESSAGE (OUTPATIENT)
Dept: ORTHOPEDICS | Facility: CLINIC | Age: 39
End: 2024-07-03
Payer: COMMERCIAL

## 2024-07-05 RX ORDER — METHYLPREDNISOLONE 4 MG/1
TABLET ORAL
Qty: 1 EACH | Refills: 0 | Status: SHIPPED | OUTPATIENT
Start: 2024-07-05 | End: 2024-07-26

## 2024-07-15 ENCOUNTER — TELEPHONE (OUTPATIENT)
Dept: PAIN MEDICINE | Facility: CLINIC | Age: 39
End: 2024-07-15
Payer: COMMERCIAL

## 2024-07-15 DIAGNOSIS — M54.16 LUMBAR RADICULOPATHY: Primary | ICD-10-CM

## 2024-07-15 NOTE — TELEPHONE ENCOUNTER
----- Message from Darcy Hodgson PA-C sent at 7/15/2024  8:33 AM CDT -----  Regarding: Order for KERI CEDENO    Patient Name: KERI CEDENO([id number])  Sex: Female  : 1985      PCP: SUHAIL, PRIMARY DOCTOR    Center: None     Types of orders made on 07/15/2024: Procedure Request    Order Date:7/15/2024  Ordering User:DARCY HODGSON [[id number]]  Encounter Provider:Darcy Hodgson PA-C [[id number]]  Authorizing   Provider: Darcy Hodgson PA-C [[id number]]  Supervising Provider:HUSEYIN TREVINO [[id number]]  Type of Supervision:Supervision Required  Department:[facility][[id number]]    Common Order Information  Procedure -> Transforaminal Injection (Specify level and laterality) Cmt: bilat             L5-S1    Order Specific Information  Order: Procedure Order to Pain Management [Custom: [id number]]  Order #:          [id number]  252Qty: 1 FUTURE    Priority: Routine  Class: Clinic Performed    Future Order Information      Expires on:07/15/2025            Expected by:07/15/2024                   Associated Diagnoses      M54.16 Lumbar radiculopathy      Facility Name: -> [facility]           Priority: Routine  Class: Clinic Performed    Future Order Information      Expires on:07/15/2025            Expected   by:07/15/2024                   Associated Diagnoses      M54.16 Lumbar radiculopathy      Procedure -> Transforaminal Injection (Specify level and laterality) Cmt:                 bilat L5-S1        Facility Name: -> [facility]

## 2024-07-15 NOTE — PROGRESS NOTES
Spoke with pt virtually. Pt was last seen 7/3/24 and continues to have low back and bilateral leg pain. Pt has tried home exercises and PT with no relief. Pain is 8/10. I provided the patient with a home exercise program. It is the [facility] spine conditioning program. Exercises include head rolls, kneeling back extension, sitting rotation stretch, modified seated side straddle, knee to chest, bird dog, plank, modified seated plank, hip bridges, abdominal bracing, and abdominal crunch. Pt completes each exercise daily for one hour with worsening of pain. MRI demonstrates advanced degenerative changes in the lower lumbar spine at the L4-L5 and L5-S1 levels with central disc protrusions resulting in marked narrowing of the lateral recesses.  Right foraminal disc protrusion at the L2-L3 level resulting in moderate narrowing of the right neural foramina at the L2-L3 level. . Will order bilateral L5-S1 TFESI with pain management. Pt will fu if pain persists.

## 2024-07-16 ENCOUNTER — CLINICAL SUPPORT (OUTPATIENT)
Dept: REHABILITATION | Facility: HOSPITAL | Age: 39
End: 2024-07-16
Payer: COMMERCIAL

## 2024-07-16 DIAGNOSIS — M54.16 LUMBAR RADICULOPATHY, CHRONIC: ICD-10-CM

## 2024-07-16 DIAGNOSIS — M54.50 CHRONIC MIDLINE LOW BACK PAIN, UNSPECIFIED WHETHER SCIATICA PRESENT: Primary | ICD-10-CM

## 2024-07-16 DIAGNOSIS — M54.50 LUMBAR PAIN: ICD-10-CM

## 2024-07-16 DIAGNOSIS — G89.29 CHRONIC MIDLINE LOW BACK PAIN, UNSPECIFIED WHETHER SCIATICA PRESENT: Primary | ICD-10-CM

## 2024-07-16 DIAGNOSIS — M54.9 DORSALGIA, UNSPECIFIED: ICD-10-CM

## 2024-07-16 PROCEDURE — 97140 MANUAL THERAPY 1/> REGIONS: CPT

## 2024-07-16 PROCEDURE — 97110 THERAPEUTIC EXERCISES: CPT

## 2024-07-16 PROCEDURE — 97161 PT EVAL LOW COMPLEX 20 MIN: CPT

## 2024-07-16 NOTE — PROGRESS NOTES
"[facility]   Physical Therapy Initial Evaluation      Name: Josephine Cruz  Clinic Number: [id number]    Therapy Diagnosis:   Encounter Diagnoses   Name Primary?    Lumbar pain     Dorsalgia, unspecified     Lumbar radiculopathy, chronic     Chronic midline low back pain, unspecified whether sciatica present Yes        Physician: Tabatha Greene, JOSE    Physician Orders: PT Eval and Treat   Medical Diagnosis from Referral:   M54.50 (ICD-10-CM) - Low back pain, unspecified   M54.9 (ICD-10-CM) - Dorsalgia, unspecified   M54.16 (ICD-10-CM) - Radiculopathy, lumbar region     Evaluation Date: 7/16/2024  Authorization Period Expiration: 12/31/2024  Plan of Care Expiration: 9/16/2024  Progress Note Due: 8/16/2024  Date of Surgery: None  Visit # / Visits authorized: 1/ 1   FOTO 1:  Precautions: Standard   Time In: 5:00 pm  Time Out: 6:00 pm  Total Billable Time: 60 minutes    Subjective   Josephine Cruz presents to the clinic for the evaluation of lower back and bilateral lower extremity pain. The pain started over 10 years ago following no inciting event and symptoms have been worsening.The pain is located in the lower back area and radiates to the bilateral feet (prior sciatica, now just tingling calves/feet).  The pain is described as  solid, aching, occasionally sharp (causes her to use a cane)  and is rated as 2/10. The pain is rated with a score of  2/10 on the BEST day and a score of 9/10 on the WORST day.  Symptoms interfere with daily activity and sleeping. The pain is exacerbated by Coughing/Sneezing, Getting out of bed/chair, and kids coming to hug.  The pain is mitigated by inversion table (hurts at first, but alleviates pain for about 20 minutes). The patient reports 4 hours of uninterrupted sleep per night.  When the pain flares up, it triggers her "vestibular neuritis."  Date of onset: 10+ years (gradual onset, flares up every month or so)  History of current " "condition - KERI reports: "the ache never went away, but the pain went down for a few months. She has gone to chiropractors, multiple rounds of physical therapy, and has an inversion table at her home, and repeated self mobilizations but everything is short term relief. The "harsh pain" started [id number]. She endorses numbness and tingling, sometimes both legs, but is near-constant in the right foot, with the "ache" feeling more to the left of the lower back, and the sharper pain being in the center all along the back.  Falls: None  Imaging: MRI studies: EXAMINATION:  MRI LUMBAR SPINE WITHOUT CONTRAST     CLINICAL HISTORY:  Low back pain, symptoms persist with > 6wks conservative treatment;Lumbar radiculopathy, symptoms persist with conservative treatment; Dorsalgia, unspecified     TECHNIQUE:  Multiplanar, multisequence MR images were acquired from the thoracolumbar junction to the sacrum without the administration of contrast.     COMPARISON:  X-ray lumbar spine dated 06/29/2024.     FINDINGS:  There is minimal retrolisthesis of L5 on S1.  The remainder of the lumbar alignment is within normal limits.     There is a probable chronic injury along the anterior superior endplate of the L5 vertebral body.  The vertebral body heights are otherwise maintained.  The bone marrow signal is within normal limits.     The conus ends at the level of L1.  The cauda equina nerve roots are within normal limits.     There is hypertrophy of the posterior elements.  There is multilevel disc desiccation.  Evaluation of the individual disc levels reveals the following:     L1-L2, normal.     L2-L3, there is a disc bulge along with facet hypertrophy and ligamentum flavum hypertrophy.  There is a right foraminal disc protrusion.  The spinal canal is within normal limits.  There is moderate right-sided neural foraminal narrowing.  The left neural foramina is unremarkable.     L3-L4, there is diffuse disc bulge along with facet " "hypertrophy and ligamentum flavum hypertrophy.  The spinal canal is within normal limits.  The neural foramina is unremarkable.     L4-L5, there is diffuse disc bulge along with facet hypertrophy and ligamentum flavum hypertrophy.  There is superimposed central disc protrusion.  There is marked narrowing of both lateral recesses.  There is mild spinal canal narrowing.  There is mild bilateral neural foraminal narrowing.     L5-S1, there is diffuse disc bulge along with facet hypertrophy and ligamentum flavum hypertrophy.  There is a posterior annular fissure.  There is central disc protrusion.  There is mild spinal canal narrowing.  There is marked narrowing the lateral recesses.  There is mild bilateral neural foraminal narrowing.     The paraspinal soft tissues are within normal limits.     Impression:     Advanced degenerative changes in the lower lumbar spine at the L4-L5 and L5-S1 levels with central disc protrusions resulting in marked narrowing of the lateral recesses.  No significant central spinal canal narrowing.     Right foraminal disc protrusion at the L2-L3 level resulting in moderate narrowing of the right neural foramina at the L2-L3 level.     Additional multilevel degenerative changes as above.    Prior Therapy: Yes; multiple times for the same complaint  Social History: Lives with their family  Occupation: Teacher   Prior Level of Function: Independent  Current Level of Function: Independent but feels as if the back pain "runs her life." She would be more involved in children's sports and less time on the sideline and being in bed.    Pain:  Current 2/10  Location: Left-Sided Lower Back Pain; Right-Sided Tingling    Description: Aching  Aggravating Factors: Slouching/Relaxed Position, Increase in Numbness When Walking,   Easing Factors: Posture (straight), Hot Bath, *"Nothing completely eases the ache.     Medical History:   No past medical history on file.    Surgical History:   Josephine Lawson" "Anthony  has a past surgical history that includes injection, spine, lumbosacral, transforaminal approach (Bilateral, 7/26/2024).    Medications:   Josephine has a current medication list which includes the following prescription(s): gabapentin and meclizine.    Allergies:   Review of patient's allergies indicates:  No Known Allergies   Objective       Right Left   Biceps Brachii -           -   Brachioradialis -          -   Triceps Brachii -          -   Patellar Tendon 2+          2+   Molly's Tendon 2+          2+     Sensation: Increased on the left with S2 and L5, with increased sensation in the right lower extremity with every other dermatomal level.     Right Left   Hip Flexors 5/5 5/5   Knee Extension 5/5 5/5   Knee Flexion 5/5 5/5   Dorsiflexion 5/5 5/5   Inversion (L4) 5/5 5/5   Eversion (L5) 5/5 5/5     Lumbar AROM Pain/Dysfunction with movement   Flexion  (45-50 degrees) Full degrees (+) pain upon return from flexion   Extension  (25 degrees) Full degrees Sacral flexion maintained, hinge-point at lower lumbar spine   Right Side Bending  (20 degrees) Full degrees -   Left Side Bending  (20 degrees) Full degrees -   Right Rotation   Full degrees -   Left Rotation Full degrees -     Special Testing  Robbi Test    Slump Test +for parasthesia on the right, and left pain on the back     Intake Outcome Measure for FOTO Lumbar Survey    Therapist reviewed FOTO scores for Josephine Cruz on 7/16/2024.   FOTO report - see Media section or FOTO account episode details.    Intake Score: 46%         Treatment     Total Treatment time (time-based codes) separate from Evaluation: 16 minutes     JOSEPHINE received the treatments listed below:      therapeutic exercises to develop strength, endurance, ROM, flexibility, and posture for 8 minutes including:  Slump Sliders, 20x5" each side    manual therapy techniques: Joint mobilizations were applied for 8 minutes, including:  Grade V Thoracic Manipulation    Patient " Education and Home Exercises     Education provided:   -Findings of evaluation and examination, and affect of these on plan for treatment  -Prognosis and expectations  -Role of PT and team-centered care for patient  -Home exercise program and expectations of therapy    Written Home Exercises Provided: yes. Exercises were reviewed and JOSEPHINE was able to demonstrate them prior to the end of the session.  JOSEPHINE demonstrated good  understanding of the education provided. See EMR under Patient Instructions for exercises provided during therapy sessions.    Assessment     Josephine is a 39 y.o. female referred to outpatient Physical Therapy with a medical diagnosis of   M54.50 (ICD-10-CM) - Low back pain, unspecified   M54.9 (ICD-10-CM) - Dorsalgia, unspecified   M54.16 (ICD-10-CM) - Radiculopathy, lumbar region   Patient presents with limitations in higher level functional tasks and activities 2/2 limitation in prolonged sitting and standing or walking due to limitations in thoracic mobility, adverse neural tension, pain with prolonged extension and flexion, and chronicity of pain. Josephine will benefit from a customized physical therapy plan of care  to address the aforementioned impairments in an effort to improve human function and quality of life.      Patient prognosis is Fair.   Patient will benefit from skilled outpatient Physical Therapy to address the deficits stated above and in the chart below, provide patient /family education, and to maximize patientt's level of independence.     Plan of care discussed with patient: Yes  Patient's spiritual, cultural and educational needs considered and patient is agreeable to the plan of care and goals as stated below:     Anticipated Barriers for therapy: Chronicity, Pain Beliefs, Start Back 8    Medical Necessity is demonstrated by the following  History  Co-morbidities and personal factors that may impact the plan of care [] LOW: no personal factors / co-morbidities  [x]  MODERATE: 1-2 personal factors / co-morbidities  [] HIGH: 3+ personal factors / co-morbidities    Moderate / High Support Documentation:   Co-morbidities affecting plan of care: See Medical History    Personal Factors:   age  attitudes     Examination  Body Structures and Functions, activity limitations and participation restrictions that may impact the plan of care [x] LOW: addressing 1-2 elements  [] MODERATE: 3+ elements  [] HIGH: 4+ elements (please support below)    Moderate / High Support Documentation: Low     Clinical Presentation [x] LOW: stable  [] MODERATE: Evolving  [] HIGH: Unstable     Decision Making/ Complexity Score: low       Goals:  Short Term Goals: 2 weeks   1.) Patient will demonstrate independence in compliance and technique of home exercise program provided as per teach-back method of assessment.  2.) Patient will demonstrate a 5-point improvement as per FOTO score to demonstrate improvements in perceived functional ability.    Long Term Goals: 4 weeks   1.) Patient will demonstrate independence in compliance and technique of home exercise program provided as per teach-back method of assessment.  2.) Patient will demonstrate a 10-point improvement as per FOTO score to demonstrate improvements in perceived functional ability.  3.) Patient will demonstrate a <5 score on Start Back to improve pain beliefs.  Plan     Plan of care Certification: 7/16/2024 to 9/16/2024.    Outpatient Physical Therapy 1 times weekly for 6 weeks to include the following interventions: Manual Therapy, Moist Heat/ Ice, Neuromuscular Re-ed, Patient Education, Self Care, Therapeutic Activities, and Therapeutic Exercise.     Breana Ramirez PT DPT  Board Certified in Orthopedic Physical Therapy          Physician's Signature: _________________________________________ Date: ________________

## 2024-07-17 ENCOUNTER — PATIENT MESSAGE (OUTPATIENT)
Dept: SPINE | Facility: CLINIC | Age: 39
End: 2024-07-17
Payer: COMMERCIAL

## 2024-07-19 ENCOUNTER — TELEPHONE (OUTPATIENT)
Dept: PAIN MEDICINE | Facility: CLINIC | Age: 39
End: 2024-07-19
Payer: COMMERCIAL

## 2024-07-24 NOTE — PRE-PROCEDURE INSTRUCTIONS
Patient reviewed on 7/24/2024.  Okay to proceed at [place]. The following pre-procedure instructions and arrival time have been reviewed with patient via phone and sent to patient portal for review.  Patient verbalized an understanding.  Pt to be accompanied by Kolea dariana of procedure as responsible .     Dear KERI ,     You are scheduled for a pain procedure on 7/26/24 with Dr. Ann, please report to [facility]   [address] [facility].     Please park in the lot in front of the building and enter at the main entrance. Proceed to the second floor for registration.     Arrival time: 7 am     Anesthesia fasting instructions:   IV sedation. You should not eat for 8 hours and can only drink clear liquids (water or black coffee without cream/sugar) up until 2 hours before your scheduled time.  You CANNOT drive yourself and must have a .     *Refrain from drinking any alcohol  *Please insure that you have pre-planned your ride home IF YOU ARE to have sedation of any kind You CANNOT drive yourself home.    *You may not leave alone in an uber, taxi, etc. IF YOU HAVE received sedating medications by mouth or by vein  *You must be discharged to a responsible adult.   *Please remain under adult supervision for 24 hours if you had any form of sedation.      If possible, please have your visitor &/or ride home stay during your visit.   The surgeon should speak with your visitors after your procedure.  All visitors must be 18 years of age or older. Please limit your visitors to max of 2 people.  Please plan on being here for roughly 2-3 hours.   If you are on blood thinners, you need to follow the anticoagulation instructions that had been discussed previously.     IF you were told to stop your blood thinners, this is how long you should generally hold some of the more common ones.  Remember that stopping blood thinners is only necessary for certain procedures. If you are unsure of your  instructions, please call us.      You should take any medications that you routinely take for blood pressure, heart medications, thyroid, cholesterol, etc with small sips of water.   If you are a diabetic, do not take your medication if you will be fasting, but bring it with you.   Please plan on being here for roughly 2 hours.   HOLD vitamins and supplements the morning of your procedure.  HOLD any Anticoagulants (ex. Aspirin, goody or BC powder, Coumadin, Eliquis, Heparin, Plavix, Lovenox, Xarelto, etc.) for a total of 5 days  HOLD any non-insulin injections until after procedure (Ozempic, Mounjaro, Trulicity, Victoza, Byetta, Wegovy, Adlyxin, etc) (you should have been instructed to hold for a 7 day period)     Shower the night before and the morning of your procedure with antibacterial soap (ex. Dial)   Please do not use antibacterial soap to wash your face. Use your regular face soap.  Do not apply any products to your skin nor your hair after you shower the morning of your procedure.         Products include: lotions, oils, ointments, creams, gels, powders, lotion, deodorant, make-up, perfume/cologne, after shave and sunscreen.        No contacts. Bring glasses if necessary.  If you have dentures, please bring a case.  Please leave all jewelry and valuables at home.  Wear loose comfortable clothes (preferably an button up shirt), All patients will need to be placed in a gown for procedure.     ---If you start to feel sick (fever, chills, coughing, sore throat, etc) or start on or have been taking any antibiotics, please contact your doctor's office at [phone] your procedure would have to be rescheduled.      Please reply to this message as receipt of delivery.     Thanks,  Catina, LPN [facility]  Pre-Admit Testing

## 2024-07-25 ENCOUNTER — CLINICAL SUPPORT (OUTPATIENT)
Dept: REHABILITATION | Facility: HOSPITAL | Age: 39
End: 2024-07-25
Payer: COMMERCIAL

## 2024-07-25 DIAGNOSIS — M54.9 DORSALGIA, UNSPECIFIED: ICD-10-CM

## 2024-07-25 DIAGNOSIS — M54.16 LUMBAR RADICULOPATHY, CHRONIC: ICD-10-CM

## 2024-07-25 DIAGNOSIS — G89.29 CHRONIC MIDLINE LOW BACK PAIN, UNSPECIFIED WHETHER SCIATICA PRESENT: ICD-10-CM

## 2024-07-25 DIAGNOSIS — M54.50 CHRONIC MIDLINE LOW BACK PAIN, UNSPECIFIED WHETHER SCIATICA PRESENT: ICD-10-CM

## 2024-07-25 DIAGNOSIS — M54.50 LUMBAR PAIN: Primary | ICD-10-CM

## 2024-07-25 PROCEDURE — 97112 NEUROMUSCULAR REEDUCATION: CPT

## 2024-07-25 PROCEDURE — 97110 THERAPEUTIC EXERCISES: CPT

## 2024-07-25 PROCEDURE — 97140 MANUAL THERAPY 1/> REGIONS: CPT

## 2024-07-25 NOTE — PROGRESS NOTES
Physical Therapy Daily Treatment Note     Name: Josephine Lawson [facility]  Clinic Number: [id number]    Therapy Diagnosis:   Encounter Diagnoses   Name Primary?    Lumbar pain Yes    Chronic midline low back pain, unspecified whether sciatica present     Lumbar radiculopathy, chronic     Dorsalgia, unspecified      Physician: Tabatha Greene PA-C    Visit Date: 7/25/2024  Physician Orders: PT Eval and Treat   Medical Diagnosis from Referral:   M54.50 (ICD-10-CM) - Low back pain, unspecified   M54.9 (ICD-10-CM) - Dorsalgia, unspecified   M54.16 (ICD-10-CM) - Radiculopathy, lumbar region      Evaluation Date: 7/16/2024  Authorization Period Expiration: 12/31/2024  Plan of Care Expiration: 9/16/2024  Progress Note Due: 8/16/2024  Date of Surgery: None  Visit # / Visits authorized: 1/ 20   FOTO 1:  Precautions: Standard     Time In: 5:00 pm  Time Out: 5:55 pm  Total Billable Time: 55 minutes  Precautions: Standard    Subjective   Pt reports: she is not really having a great deal of pain at this time..  She was compliant with home exercise program.  Response to previous treatment: Initial Evaluation  Functional change: Ongoing  Pain: 0/10  Location: Bilateral Lower Back   Objective   Slump Test: (+)    JOSEPHINE received therapeutic exercises to develop strength, endurance, ROM, flexibility, and posture for 18 minutes including:  Upright Bike, 8 minutes  Supine Thoracic Extension Mobilizations, with foam roller, 20x  Seated Sciatic Slump Gliders, 20x each side  Objective measures taken (see above)      JOSEPHINE received the following manual therapy techniques: Joint mobilizations were applied for 10 minutes, including:  Grade V Thoracic Manipulation  Grade V L5-S1 Gapping Mobilization, right side      JOSEPHINE participated in neuromuscular re-education activities to improve: Balance, Coordination, Kinesthetic, Sense, Proprioception, and Posture for 27 minutes. The following activities were included:  TrA Activation with  "Biofeedback Cuff, 15x10" hold  TrA Activation with Biofeedback Cuff + Bent Knee Fall Out, 10x each side, rest break in between repetitions  Clamshells with Half Plank    JOSEPHINE participated in dynamic functional therapeutic activities to improve functional performance for 0  minutes, includin  Home Exercises Provided and Patient Education Provided     Education provided:   - Home exercise program     Written Home Exercises Provided: Patient instructed to cont prior HEP.  Exercises were reviewed and JOSEPHINE was able to demonstrate them prior to the end of the session.  JOSEPHINE demonstrated good  understanding of the education provided.     See EMR under Patient Instructions for exercises provided prior visit.    Assessment   Josephine demonstrates a (+) Slump Test again this visit, without any pain noted during lumbar active cardinal plane range of motion. She demonstrates good ability to immediately initiate correct activation of TrA. Patient to undergo MAINOR on .    JOSEPHINE Is progressing well towards her goals.   Pt prognosis is Fair.     Pt will continue to benefit from skilled outpatient physical therapy to address the deficits listed in the problem list box on initial evaluation, provide pt/family education and to maximize pt's level of independence in the home and community environment.     Pt's spiritual, cultural and educational needs considered and pt agreeable to plan of care and goals.     Anticipated barriers to physical therapy: : Chronicity, Pain Beliefs, Start Back 8    Goals: Goals:  Short Term Goals: 2 weeks   1.) Patient will demonstrate independence in compliance and technique of home exercise program provided as per teach-back method of assessment.  2.) Patient will demonstrate a 5-point improvement as per FOTO score to demonstrate improvements in perceived functional ability.     Long Term Goals: 4 weeks   1.) Patient will demonstrate independence in compliance and technique of home exercise " program provided as per teach-back method of assessment.  2.) Patient will demonstrate a 10-point improvement as per FOTO score to demonstrate improvements in perceived functional ability.  3.) Patient will demonstrate a <5 score on Start Back to improve pain beliefs.    Plan     Continue to progress with lumbopelvic stabilization, pain science education and cardiovascular endurance exercise of moderate intensity.    Breana Ramirez, PT, DPT  Board Certified in Orthopedic Physical Therapy

## 2024-07-26 ENCOUNTER — HOSPITAL ENCOUNTER (OUTPATIENT)
Facility: HOSPITAL | Age: 39
Discharge: HOME | End: 2024-07-26
Attending: STUDENT IN AN ORGANIZED HEALTH CARE EDUCATION/TRAINING PROGRAM | Admitting: STUDENT IN AN ORGANIZED HEALTH CARE EDUCATION/TRAINING PROGRAM
Payer: COMMERCIAL

## 2024-07-26 VITALS
HEART RATE: 72 BPM | BODY MASS INDEX: 24.66 KG/M2 | TEMPERATURE: 99 F | WEIGHT: 148 LBS | DIASTOLIC BLOOD PRESSURE: 82 MMHG | SYSTOLIC BLOOD PRESSURE: 120 MMHG | HEIGHT: 65 IN | RESPIRATION RATE: 16 BRPM | OXYGEN SATURATION: 100 %

## 2024-07-26 DIAGNOSIS — G89.29 CHRONIC PAIN: ICD-10-CM

## 2024-07-26 DIAGNOSIS — M54.16 LUMBAR RADICULOPATHY: Primary | ICD-10-CM

## 2024-07-26 LAB
B-HCG UR QL: NEGATIVE
CTP QC/QA: YES

## 2024-07-26 PROCEDURE — 64483 NJX AA&/STRD TFRM EPI L/S 1: CPT | Mod: 50 | Performed by: STUDENT IN AN ORGANIZED HEALTH CARE EDUCATION/TRAINING PROGRAM

## 2024-07-26 PROCEDURE — 25500020 PHARM REV CODE 255: Performed by: STUDENT IN AN ORGANIZED HEALTH CARE EDUCATION/TRAINING PROGRAM

## 2024-07-26 PROCEDURE — 25000003 PHARM REV CODE 250: Performed by: STUDENT IN AN ORGANIZED HEALTH CARE EDUCATION/TRAINING PROGRAM

## 2024-07-26 PROCEDURE — 63600175 PHARM REV CODE 636 W HCPCS: Performed by: STUDENT IN AN ORGANIZED HEALTH CARE EDUCATION/TRAINING PROGRAM

## 2024-07-26 PROCEDURE — 81025 URINE PREGNANCY TEST: CPT | Performed by: STUDENT IN AN ORGANIZED HEALTH CARE EDUCATION/TRAINING PROGRAM

## 2024-07-26 PROCEDURE — 64483 NJX AA&/STRD TFRM EPI L/S 1: CPT | Mod: 50,,, | Performed by: STUDENT IN AN ORGANIZED HEALTH CARE EDUCATION/TRAINING PROGRAM

## 2024-07-26 PROCEDURE — 99152 MOD SED SAME PHYS/QHP 5/>YRS: CPT | Performed by: STUDENT IN AN ORGANIZED HEALTH CARE EDUCATION/TRAINING PROGRAM

## 2024-07-26 RX ORDER — LIDOCAINE HYDROCHLORIDE 10 MG/ML
INJECTION, SOLUTION EPIDURAL; INFILTRATION; INTRACAUDAL; PERINEURAL
Status: DISCONTINUED | OUTPATIENT
Start: 2024-07-26 | End: 2024-07-26 | Stop reason: HOSPADM

## 2024-07-26 RX ORDER — SODIUM CHLORIDE 9 MG/ML
500 INJECTION, SOLUTION INTRAVENOUS CONTINUOUS
Status: DISCONTINUED | OUTPATIENT
Start: 2024-07-26 | End: 2024-07-26 | Stop reason: HOSPADM

## 2024-07-26 RX ORDER — DEXAMETHASONE SODIUM PHOSPHATE 10 MG/ML
INJECTION INTRAMUSCULAR; INTRAVENOUS
Status: DISCONTINUED | OUTPATIENT
Start: 2024-07-26 | End: 2024-07-26 | Stop reason: HOSPADM

## 2024-07-26 RX ORDER — LIDOCAINE HYDROCHLORIDE 10 MG/ML
1 INJECTION, SOLUTION EPIDURAL; INFILTRATION; INTRACAUDAL; PERINEURAL ONCE
Status: DISCONTINUED | OUTPATIENT
Start: 2024-07-26 | End: 2024-07-26 | Stop reason: HOSPADM

## 2024-07-26 RX ORDER — FENTANYL CITRATE 50 UG/ML
INJECTION, SOLUTION INTRAMUSCULAR; INTRAVENOUS
Status: DISCONTINUED | OUTPATIENT
Start: 2024-07-26 | End: 2024-07-26 | Stop reason: HOSPADM

## 2024-07-26 RX ORDER — MIDAZOLAM HYDROCHLORIDE 1 MG/ML
INJECTION INTRAMUSCULAR; INTRAVENOUS
Status: DISCONTINUED | OUTPATIENT
Start: 2024-07-26 | End: 2024-07-26 | Stop reason: HOSPADM

## 2024-07-26 RX ORDER — LIDOCAINE HYDROCHLORIDE 20 MG/ML
INJECTION, SOLUTION EPIDURAL; INFILTRATION; INTRACAUDAL; PERINEURAL
Status: DISCONTINUED | OUTPATIENT
Start: 2024-07-26 | End: 2024-07-26 | Stop reason: HOSPADM

## 2024-07-26 NOTE — PLAN OF CARE
Discharge instructions provided to the patient. Patient verbalized understanding of the instructions. Endorses tingling to RLE, noted prior to admit.  Denies any numbness, able to LOZANO without difficulties. IV removed, cath tip intact.  VSS.   No concerns voiced. Escorted patient via wheelchair to family member awaiting in private vehicle.

## 2024-07-26 NOTE — DISCHARGE INSTRUCTIONS
Home Care Instructions Pain Management:     1.  DIET:     You may resume your normal diet today.     2.  BATHING:     You may shower with luke warm water.     3.  DRESSING:     You may remove your bandage today.     4.  ACTIVITY LEVEL:       You may resume your normal activities 24 hours after your procedure.     5.  MEDICATIONS:     You may resume your normal medications today.     6.  SPECIAL INSTRUCTIONS:     No heat to the injection site for 24 hours including bath or shower, heating pad, moist heat or hot tubs.     Use an ice pack to the injection site for any pain or discomfort.  Apply ice packs for 20 minute intervals as needed.     If you have received any sedatives by mouth today, you can not drive for 12 hours.     If you have received sedation through an IV, you can not drive for 24 hours.     PLEASE CALL YOUR DOCTOR FOR THE FOLLOWIN.  Redness or swelling around the injection site.  2.  Fever of 101 degrees.  3.  Drainage (pus) from the injection site.  4.  For any continuous bleeding (some dried blood over the incision is normal.)     FOR EMERGENCIES:     If any unusual problems or difficulties occur during clinic hours, call [phone] or dial [phone].     Follow up with with your physician in 2-3 weeks.

## 2024-07-26 NOTE — INTERVAL H&P NOTE
The patient was examined and no significant changes were noted from the updated H&P or last clinic note.    The risks and benefits of this procedure, including alternative therapies, were discussed with the patient.  The discussion of risks included infection, bleeding, need for additional procedures or surgery, nerve damage, paralysis, adverse medication reaction(s), stroke, and if appropriate for the procedure, death.  Questions regarding the procedure, risks, expected outcome, and possible side effects were solicited and answered to KERI's satisfaction.  KERI Anthony wishes to proceed with the injection or procedure as confirmed by written consent.

## 2024-07-26 NOTE — DISCHARGE SUMMARY
Discharge Note  Short Stay      SUMMARY     Admit Date: 7/26/2024    Attending Physician: Irina Ann MD PhD    Discharge Physician: Irina Ann      Discharge Date: 7/26/2024 8:40 AM    Procedure(s) (LRB):  bilat L5-S1 TFESI (Bilateral)    Final Diagnosis: Lumbar radiculopathy [M54.16]    Disposition: Home or self care    Patient Instructions:   Current Discharge Medication List        CONTINUE these medications which have NOT CHANGED    Details   gabapentin (NEURONTIN) 100 MG capsule Take 1 capsule (100 mg total) by mouth 3 (three) times daily.  Qty: 90 capsule, Refills: 11      meclizine (ANTIVERT) 25 mg tablet Take 1 tablet (25 mg total) by mouth 3 (three) times daily as needed for Dizziness.  Qty: 30 tablet, Refills: 1      methylPREDNISolone (MEDROL DOSEPACK) 4 mg tablet use as directed  Qty: 1 each, Refills: 0                 Discharge Diagnosis: Lumbar radiculopathy [M54.16]  Condition on Discharge: Stable with no complications to procedure   Diet on Discharge: Same as before.  Activity: as per instruction sheet.  Discharge to: Home with a responsible adult.  Follow up: 2-4 weeks       Please call my office or pager at [phone] if experienced any weakness or loss of sensation, fever > 101.5, pain uncontrolled with oral medications, persistent nausea/vomiting/or diarrhea, redness or drainage from the incisions, or any other worrisome concerns. If physician on call was not reached or could not communicate with our office for any reason please go to the nearest emergency department      Irina Ann MD PhD

## 2024-07-26 NOTE — OP NOTE
Lumbar Transforaminal Epidural Steroid Injection under Fluoroscopic Guidance    The procedure, risks, benefits, and options were discussed with the patient. There are no contraindications to the procedure. The patent expressed understanding and agreed to the procedure. Informed written consent was obtained prior to the start of the procedure and can be found in the patient's chart.    PATIENT NAME: Josephine Crzu   MRN: [id number]     DATE OF PROCEDURE: 07/26/2024    PROCEDURE:  Bilateral  L5/S1 Lumbar Transforaminal Epidural Steroid Injection under Fluoroscopic Guidance    PRE-OP DIAGNOSIS: Lumbar radiculopathy [M54.16] Lumbar radiculopathy [M54.16]    POST-OP DIAGNOSIS: Same    PHYSICIAN: Irina Ann MD    ASSISTANTS: None     MEDICATIONS INJECTED: Preservative-free Decadron 10mg with 5cc of Lidocaine 1% MPF     LOCAL ANESTHETIC INJECTED: Xylocaine 2%     SEDATION: Versed 2mg and Fentanyl 50mcg                                                                                                                                                                                     Conscious sedation ordered by M.D. Patient re-evaluation prior to administration of conscious sedation. No changes noted in patient's status from initial evaluation. The patient's vital signs were monitored by RN and patient remained hemodynamically stable throughout the procedure.    Event Time In   Sedation Start 0826   Sedation End     0838    ESTIMATED BLOOD LOSS: None    COMPLICATIONS: None    TECHNIQUE: Time-out was performed to identify the patient and procedure to be performed. With the patient laying in a prone position, the surgical area was prepped and draped in the usual sterile fashion using ChloraPrep and a fenestrated drape.The levels were determined under fluoroscopy guidance. Skin anesthesia was achieved by injecting Lidocaine 2% over the injection sites. The transforaminal spaces were then approached with a 22 gauge, 5 inch spinal  quinke needle that was introduced under fluoroscopic guidance in the AP and Lateral views. Once the needle tip was in the area of the transforaminal space, and there was no blood, CSF or paraesthesias, contrast dye Omnipaque (300mg/mL) was injected to confirm placement and there was no vascular runoff. Fluoroscopic imaging in the AP and lateral views revealed a clear outline of the spinal nerve with proximal spread of agent through the neural foramen into the epidural space. 3 mL of the medication mixture listed above was injected slowly at each site. Displacement of the radio opaque contrast after injection of the medication confirmed that the medication went into the area of the transforaminal spaces. The needles were removed and bleeding was nil. A sterile dressing was applied. No specimens collected. The patient tolerated the procedure well.     The patient was monitored after the procedure in the recovery area. They were given post-procedure and discharge instructions to follow at home. The patient was discharged in a stable condition.    Irina Ann MD

## 2024-08-01 ENCOUNTER — TELEPHONE (OUTPATIENT)
Dept: PAIN MEDICINE | Facility: CLINIC | Age: 39
End: 2024-08-01
Payer: COMMERCIAL

## 2024-08-01 NOTE — PLAN OF CARE
"[facility]   Physical Therapy Initial Evaluation      Name: Josephine Cruz  Clinic Number: [id number]    Therapy Diagnosis:   Encounter Diagnoses   Name Primary?    Lumbar pain     Dorsalgia, unspecified     Lumbar radiculopathy, chronic     Chronic midline low back pain, unspecified whether sciatica present Yes        Physician: Tabatha Greene, JOSE    Physician Orders: PT Eval and Treat   Medical Diagnosis from Referral:   M54.50 (ICD-10-CM) - Low back pain, unspecified   M54.9 (ICD-10-CM) - Dorsalgia, unspecified   M54.16 (ICD-10-CM) - Radiculopathy, lumbar region     Evaluation Date: 7/16/2024  Authorization Period Expiration: 12/31/2024  Plan of Care Expiration: 9/16/2024  Progress Note Due: 8/16/2024  Date of Surgery: None  Visit # / Visits authorized: 1/ 1   FOTO 1:  Precautions: Standard   Time In: 5:00 pm  Time Out: 6:00 pm  Total Billable Time: 60 minutes    Subjective   Josephine Cruz presents to the clinic for the evaluation of lower back and bilateral lower extremity pain. The pain started over 10 years ago following no inciting event and symptoms have been worsening.The pain is located in the lower back area and radiates to the bilateral feet (prior sciatica, now just tingling calves/feet).  The pain is described as  solid, aching, occasionally sharp (causes her to use a cane)  and is rated as 2/10. The pain is rated with a score of  2/10 on the BEST day and a score of 9/10 on the WORST day.  Symptoms interfere with daily activity and sleeping. The pain is exacerbated by Coughing/Sneezing, Getting out of bed/chair, and kids coming to hug.  The pain is mitigated by inversion table (hurts at first, but alleviates pain for about 20 minutes). The patient reports 4 hours of uninterrupted sleep per night.  When the pain flares up, it triggers her "vestibular neuritis."  Date of onset: 10+ years (gradual onset, flares up every month or so)  History of current " "condition - KERI reports: "the ache never went away, but the pain went down for a few months. She has gone to chiropractors, multiple rounds of physical therapy, and has an inversion table at her home, and repeated self mobilizations but everything is short term relief. The "harsh pain" started [id number]. She endorses numbness and tingling, sometimes both legs, but is near-constant in the right foot, with the "ache" feeling more to the left of the lower back, and the sharper pain being in the center all along the back.  Falls: None  Imaging: MRI studies: EXAMINATION:  MRI LUMBAR SPINE WITHOUT CONTRAST     CLINICAL HISTORY:  Low back pain, symptoms persist with > 6wks conservative treatment;Lumbar radiculopathy, symptoms persist with conservative treatment; Dorsalgia, unspecified     TECHNIQUE:  Multiplanar, multisequence MR images were acquired from the thoracolumbar junction to the sacrum without the administration of contrast.     COMPARISON:  X-ray lumbar spine dated 06/29/2024.     FINDINGS:  There is minimal retrolisthesis of L5 on S1.  The remainder of the lumbar alignment is within normal limits.     There is a probable chronic injury along the anterior superior endplate of the L5 vertebral body.  The vertebral body heights are otherwise maintained.  The bone marrow signal is within normal limits.     The conus ends at the level of L1.  The cauda equina nerve roots are within normal limits.     There is hypertrophy of the posterior elements.  There is multilevel disc desiccation.  Evaluation of the individual disc levels reveals the following:     L1-L2, normal.     L2-L3, there is a disc bulge along with facet hypertrophy and ligamentum flavum hypertrophy.  There is a right foraminal disc protrusion.  The spinal canal is within normal limits.  There is moderate right-sided neural foraminal narrowing.  The left neural foramina is unremarkable.     L3-L4, there is diffuse disc bulge along with facet " "hypertrophy and ligamentum flavum hypertrophy.  The spinal canal is within normal limits.  The neural foramina is unremarkable.     L4-L5, there is diffuse disc bulge along with facet hypertrophy and ligamentum flavum hypertrophy.  There is superimposed central disc protrusion.  There is marked narrowing of both lateral recesses.  There is mild spinal canal narrowing.  There is mild bilateral neural foraminal narrowing.     L5-S1, there is diffuse disc bulge along with facet hypertrophy and ligamentum flavum hypertrophy.  There is a posterior annular fissure.  There is central disc protrusion.  There is mild spinal canal narrowing.  There is marked narrowing the lateral recesses.  There is mild bilateral neural foraminal narrowing.     The paraspinal soft tissues are within normal limits.     Impression:     Advanced degenerative changes in the lower lumbar spine at the L4-L5 and L5-S1 levels with central disc protrusions resulting in marked narrowing of the lateral recesses.  No significant central spinal canal narrowing.     Right foraminal disc protrusion at the L2-L3 level resulting in moderate narrowing of the right neural foramina at the L2-L3 level.     Additional multilevel degenerative changes as above.    Prior Therapy: Yes; multiple times for the same complaint  Social History: Lives with their family  Occupation: Teacher   Prior Level of Function: Independent  Current Level of Function: Independent but feels as if the back pain "runs her life." She would be more involved in children's sports and less time on the sideline and being in bed.    Pain:  Current 2/10  Location: Left-Sided Lower Back Pain; Right-Sided Tingling    Description: Aching  Aggravating Factors: Slouching/Relaxed Position, Increase in Numbness When Walking,   Easing Factors: Posture (straight), Hot Bath, *"Nothing completely eases the ache.     Medical History:   No past medical history on file.    Surgical History:   Josephine Lawson" "Anthony  has a past surgical history that includes injection, spine, lumbosacral, transforaminal approach (Bilateral, 7/26/2024).    Medications:   Josephine has a current medication list which includes the following prescription(s): gabapentin and meclizine.    Allergies:   Review of patient's allergies indicates:  No Known Allergies   Objective       Right Left   Biceps Brachii -           -   Brachioradialis -          -   Triceps Brachii -          -   Patellar Tendon 2+          2+   Molly's Tendon 2+          2+     Sensation: Increased on the left with S2 and L5, with increased sensation in the right lower extremity with every other dermatomal level.     Right Left   Hip Flexors 5/5 5/5   Knee Extension 5/5 5/5   Knee Flexion 5/5 5/5   Dorsiflexion 5/5 5/5   Inversion (L4) 5/5 5/5   Eversion (L5) 5/5 5/5     Lumbar AROM Pain/Dysfunction with movement   Flexion  (45-50 degrees) Full degrees (+) pain upon return from flexion   Extension  (25 degrees) Full degrees Sacral flexion maintained, hinge-point at lower lumbar spine   Right Side Bending  (20 degrees) Full degrees -   Left Side Bending  (20 degrees) Full degrees -   Right Rotation   Full degrees -   Left Rotation Full degrees -     Special Testing  Robbi Test    Slump Test +for parasthesia on the right, and left pain on the back     Intake Outcome Measure for FOTO Lumbar Survey    Therapist reviewed FOTO scores for Josephine Cruz on 7/16/2024.   FOTO report - see Media section or FOTO account episode details.    Intake Score: 46%         Treatment     Total Treatment time (time-based codes) separate from Evaluation: 16 minutes     JOSEPHINE received the treatments listed below:      therapeutic exercises to develop strength, endurance, ROM, flexibility, and posture for 8 minutes including:  Slump Sliders, 20x5" each side    manual therapy techniques: Joint mobilizations were applied for 8 minutes, including:  Grade V Thoracic Manipulation    Patient " Education and Home Exercises     Education provided:   -Findings of evaluation and examination, and affect of these on plan for treatment  -Prognosis and expectations  -Role of PT and team-centered care for patient  -Home exercise program and expectations of therapy    Written Home Exercises Provided: yes. Exercises were reviewed and JOSEPHINE was able to demonstrate them prior to the end of the session.  JOSEPHINE demonstrated good  understanding of the education provided. See EMR under Patient Instructions for exercises provided during therapy sessions.    Assessment     Josephine is a 39 y.o. female referred to outpatient Physical Therapy with a medical diagnosis of   M54.50 (ICD-10-CM) - Low back pain, unspecified   M54.9 (ICD-10-CM) - Dorsalgia, unspecified   M54.16 (ICD-10-CM) - Radiculopathy, lumbar region   Patient presents with limitations in higher level functional tasks and activities 2/2 limitation in prolonged sitting and standing or walking due to limitations in thoracic mobility, adverse neural tension, pain with prolonged extension and flexion, and chronicity of pain. Josephine will benefit from a customized physical therapy plan of care  to address the aforementioned impairments in an effort to improve human function and quality of life.      Patient prognosis is Fair.   Patient will benefit from skilled outpatient Physical Therapy to address the deficits stated above and in the chart below, provide patient /family education, and to maximize patientt's level of independence.     Plan of care discussed with patient: Yes  Patient's spiritual, cultural and educational needs considered and patient is agreeable to the plan of care and goals as stated below:     Anticipated Barriers for therapy: Chronicity, Pain Beliefs, Start Back 8    Medical Necessity is demonstrated by the following  History  Co-morbidities and personal factors that may impact the plan of care [] LOW: no personal factors / co-morbidities  [x]  MODERATE: 1-2 personal factors / co-morbidities  [] HIGH: 3+ personal factors / co-morbidities    Moderate / High Support Documentation:   Co-morbidities affecting plan of care: See Medical History    Personal Factors:   age  attitudes     Examination  Body Structures and Functions, activity limitations and participation restrictions that may impact the plan of care [x] LOW: addressing 1-2 elements  [] MODERATE: 3+ elements  [] HIGH: 4+ elements (please support below)    Moderate / High Support Documentation: Low     Clinical Presentation [x] LOW: stable  [] MODERATE: Evolving  [] HIGH: Unstable     Decision Making/ Complexity Score: low       Goals:  Short Term Goals: 2 weeks   1.) Patient will demonstrate independence in compliance and technique of home exercise program provided as per teach-back method of assessment.  2.) Patient will demonstrate a 5-point improvement as per FOTO score to demonstrate improvements in perceived functional ability.    Long Term Goals: 4 weeks   1.) Patient will demonstrate independence in compliance and technique of home exercise program provided as per teach-back method of assessment.  2.) Patient will demonstrate a 10-point improvement as per FOTO score to demonstrate improvements in perceived functional ability.  3.) Patient will demonstrate a <5 score on Start Back to improve pain beliefs.  Plan     Plan of care Certification: 7/16/2024 to 9/16/2024.    Outpatient Physical Therapy 1 times weekly for 6 weeks to include the following interventions: Manual Therapy, Moist Heat/ Ice, Neuromuscular Re-ed, Patient Education, Self Care, Therapeutic Activities, and Therapeutic Exercise.     Breana Ramirez PT DPT  Board Certified in Orthopedic Physical Therapy          Physician's Signature: _________________________________________ Date: ________________

## 2024-08-05 ENCOUNTER — CLINICAL SUPPORT (OUTPATIENT)
Dept: REHABILITATION | Facility: HOSPITAL | Age: 39
End: 2024-08-05
Payer: COMMERCIAL

## 2024-08-05 DIAGNOSIS — M54.16 LUMBAR RADICULOPATHY, CHRONIC: ICD-10-CM

## 2024-08-05 DIAGNOSIS — M54.50 LUMBAR PAIN: Primary | ICD-10-CM

## 2024-08-05 DIAGNOSIS — G89.29 CHRONIC MIDLINE LOW BACK PAIN, UNSPECIFIED WHETHER SCIATICA PRESENT: ICD-10-CM

## 2024-08-05 DIAGNOSIS — M54.50 CHRONIC MIDLINE LOW BACK PAIN, UNSPECIFIED WHETHER SCIATICA PRESENT: ICD-10-CM

## 2024-08-05 PROCEDURE — 97110 THERAPEUTIC EXERCISES: CPT

## 2024-08-05 PROCEDURE — 97140 MANUAL THERAPY 1/> REGIONS: CPT

## 2024-08-05 PROCEDURE — 97112 NEUROMUSCULAR REEDUCATION: CPT

## 2024-08-05 NOTE — PROGRESS NOTES
"  Physical Therapy Daily Treatment Note     Name: Josephine Lawson [facility]  Clinic Number: [id number]    Therapy Diagnosis:   Encounter Diagnoses   Name Primary?    Lumbar pain Yes    Chronic midline low back pain, unspecified whether sciatica present     Lumbar radiculopathy, chronic        Physician: Tabatha Greene, JOSE    Visit Date: 8/5/2024  Physician Orders: PT Eval and Treat   Medical Diagnosis from Referral:   M54.50 (ICD-10-CM) - Low back pain, unspecified   M54.9 (ICD-10-CM) - Dorsalgia, unspecified   M54.16 (ICD-10-CM) - Radiculopathy, lumbar region      Evaluation Date: 7/16/2024  Authorization Period Expiration: 12/31/2024  Plan of Care Expiration: 9/16/2024  Progress Note Due: 8/16/2024  Date of Surgery: None  Visit # / Visits authorized: 3/ 20   FOTO 1:  Precautions: Standard   Time In: 5:00 pm  Time Out: 5:55 pm  Total Billable Time: 55 minutes  Precautions: Standard    Subjective   Pt reports: she was 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 Josephine is at a 3/10 upon this visit. She notes that a little while after the injections she felt like there was "very little pain," but it "came back."  She was compliant with home exercise program.  Response to previous treatment: Initial Evaluation  Functional change: Ongoing  Pain: 0/10  Location: Bilateral Lower Back   Objective   Slump Test: (-)  Lumbar Flexion: (+) Goward's Sign; pain upon returning to standing; thoracic dominance  Lumbar Extension: (+) concordant symptoms; greater than flexion    JOSEPHINE received therapeutic exercises to develop strength, endurance, ROM, flexibility, and posture for 18 minutes including:  NuStep, 8 minutes, Level 7  Supine Thoracic Extension Mobilizations, with foam roller, 20x  Quadruped Rockback (Hand-[place]el [place])  Objective measures taken (see above)    JOSEPHINE received the following manual therapy " "techniques: Joint mobilizations were applied for 14 minutes, including:  Grade V Thoracic Manipulation  Grade V L5-S1 Gapping Mobilization, right side    JOSEPHINE participated in neuromuscular re-education activities to improve: Balance, Coordination, Kinesthetic, Sense, Proprioception, and Posture for 32 minutes. The following activities were included:  TrA Activation with Biofeedback Cuff, 15x10" hold  TrA Activation with Biofeedback Cuff + BKFO, 3x5 each side  Hip-Hinging with Dowel Jesse, seated, 15x  Hip-Hinging with Dowel Jesse, sitting to standing, 15x    JOSEPHINE participated in dynamic functional therapeutic activities to improve functional performance for 0  minutes, includin  Home Exercises Provided and Patient Education Provided     Education provided:   - Home exercise program     Written Home Exercises Provided: Patient instructed to cont prior HEP.  Exercises were reviewed and JOSEPHINE was able to demonstrate them prior to the end of the session.  JOSEPHINE demonstrated good  understanding of the education provided.     See EMR under Patient Instructions for exercises provided prior visit.    Assessment   Josephine exhibits concordant right-sided lumbar spine pain (pain will often change location) during both extension and flexion, with extension being more exacerbating, and flexion demonstrating excessive thoracic and lumbar dominance, and a (+) Goward's sign upon returning. Hip-hinging is very difficult for Josephine to master, and this is addressed in therapy today. Symptoms during treatment are improved after manual therapy lumbar gapping, however there is no numbness and tingling this visit and no adverse neural tension with standard testing. Could be sensitized in future visits.    JOSEPHINE Is progressing well towards her goals.   Pt prognosis is Fair.     Pt will continue to benefit from skilled outpatient physical therapy to address the deficits listed in the problem list box on initial evaluation, provide " pt/family education and to maximize pt's level of independence in the home and community environment.     Pt's spiritual, cultural and educational needs considered and pt agreeable to plan of care and goals.     Anticipated barriers to physical therapy: : Chronicity, Pain Beliefs, Start Back 8    Goals: Goals:  Short Term Goals: 2 weeks   1.) Patient will demonstrate independence in compliance and technique of home exercise program provided as per teach-back method of assessment.  2.) Patient will demonstrate a 5-point improvement as per FOTO score to demonstrate improvements in perceived functional ability.     Long Term Goals: 4 weeks   1.) Patient will demonstrate independence in compliance and technique of home exercise program provided as per teach-back method of assessment.  2.) Patient will demonstrate a 10-point improvement as per FOTO score to demonstrate improvements in perceived functional ability.  3.) Patient will demonstrate a <5 score on Start Back to improve pain beliefs.    Plan     Continue to progress with lumbopelvic stabilization, pain science education and cardiovascular endurance exercise of moderate intensity.    Breana Ramirez, PT, DPT  Board Certified in Orthopedic Physical Therapy

## 2024-09-13 ENCOUNTER — OFFICE VISIT (OUTPATIENT)
Dept: DERMATOLOGY | Facility: CLINIC | Age: 39
End: 2024-09-13
Payer: COMMERCIAL

## 2024-09-13 DIAGNOSIS — L71.0 PERIORAL DERMATITIS: ICD-10-CM

## 2024-09-13 DIAGNOSIS — L73.8 SEBACEOUS HYPERPLASIA: ICD-10-CM

## 2024-09-13 DIAGNOSIS — L82.1 SEBORRHEIC KERATOSES: ICD-10-CM

## 2024-09-13 DIAGNOSIS — L81.4 LENTIGO: ICD-10-CM

## 2024-09-13 DIAGNOSIS — Z12.83 SCREENING EXAM FOR SKIN CANCER: ICD-10-CM

## 2024-09-13 DIAGNOSIS — D22.9 MULTIPLE BENIGN NEVI: ICD-10-CM

## 2024-09-13 DIAGNOSIS — D48.5 NEOPLASM OF UNCERTAIN BEHAVIOR OF SKIN: Primary | ICD-10-CM

## 2024-09-13 PROCEDURE — 99999 PR PBB SHADOW E&M-EST. PATIENT-LVL III: CPT | Mod: PBBFAC,,, | Performed by: STUDENT IN AN ORGANIZED HEALTH CARE EDUCATION/TRAINING PROGRAM

## 2024-09-13 RX ORDER — METRONIDAZOLE 7.5 MG/G
GEL TOPICAL 2 TIMES DAILY
Qty: 45 G | Refills: 4 | Status: SHIPPED | OUTPATIENT
Start: 2024-09-13 | End: 2025-09-13

## 2024-09-13 RX ORDER — TACROLIMUS 1 MG/G
OINTMENT TOPICAL 2 TIMES DAILY
Qty: 30 G | Refills: 4 | Status: SHIPPED | OUTPATIENT
Start: 2024-09-13

## 2024-09-13 NOTE — PROGRESS NOTES
Subjective:      Patient ID:  Josephine Cruz is a 39 y.o. female who presents for   Chief Complaint   Patient presents with    Skin Check     Tbse     Patient here for Total Body Skin Exam    Last seen by dermatologist: Never    No - personal history of atypical moles removed  No - personal history of MM   No - family history of MM  No - childhood blistering sunburns  No - tanning bed use  No - personal history of NMSC    Patient with specific complaint of lesion(s)  Location: R axilla  Duration: As abdifatah as pt remembers  Symptoms: Occ irritating, raised  Relieving factors/Previous treatments: none    Pt states also has occ eczema on hands. Uses cerave cream occ.              Review of Systems   Skin:  Positive for activity-related sunscreen use. Negative for daily sunscreen use and recent sunburn.   Hematologic/Lymphatic: Bruises/bleeds easily (Bruises easily).       Objective:   Physical Exam   Constitutional: She appears well-developed and well-nourished. No distress.   Neurological: She is alert and oriented to person, place, and time. She is not disoriented.   Psychiatric: She has a normal mood and affect.   Skin:   Areas Examined (abnormalities noted in diagram):   Scalp / Hair Palpated and Inspected  Head / Face Inspection Performed  Neck Inspection Performed  Chest / Axilla Inspection Performed  Abdomen Inspection Performed  Genitals / Buttocks / Groin Inspection Performed  Back Inspection Performed  RUE Inspected  LUE Inspection Performed  RLE Inspected  LLE Inspection Performed  Nails and Digits Inspection Performed                     Diagram Legend     Erythematous scaling macule/papule c/w actinic keratosis       Vascular papule c/w angioma      Pigmented verrucoid papule/plaque c/w seborrheic keratosis      Yellow umbilicated papule c/w sebaceous hyperplasia      Irregularly shaped tan macule c/w lentigo     1-2 mm smooth white papules consistent with Milia      Movable subcutaneous cyst with  punctum c/w epidermal inclusion cyst      Subcutaneous movable cyst c/w pilar cyst      Firm pink to brown papule c/w dermatofibroma      Pedunculated fleshy papule(s) c/w skin tag(s)      Evenly pigmented macule c/w junctional nevus     Mildly variegated pigmented, slightly irregular-bordered macule c/w mildly atypical nevus      Flesh colored to evenly pigmented papule c/w intradermal nevus       Pink pearly papule/plaque c/w basal cell carcinoma      Erythematous hyperkeratotic cursted plaque c/w SCC      Surgical scar with no sign of skin cancer recurrence      Open and closed comedones      Inflammatory papules and pustules      Verrucoid papule consistent consistent with wart     Erythematous eczematous patches and plaques     Dystrophic onycholytic nail with subungual debris c/w onychomycosis     Umbilicated papule    Erythematous-base heme-crusted tan verrucoid plaque consistent with inflamed seborrheic keratosis     Erythematous Silvery Scaling Plaque c/w Psoriasis     See annotation            Assessment / Plan:      Pathology Orders:       Normal Orders This Visit    Specimen to Pathology, Dermatology     Questions:    Procedure Type: Dermatology and skin neoplasms    Number of Specimens: 2    ------------------------: -------------------------    Spec 1 Procedure: Biopsy    Spec 1 Clinical Impression: IDN vs other    Spec 1 Source: right axilla superior    ------------------------: -------------------------    Spec 2 Procedure: Biopsy    Spec 2 Clinical Impression: IDN vs other    Spec 2 Source: right axilla inferior    Release to patient: Immediate    Release to patient:           Neoplasm of uncertain behavior of skin  -     Specimen to Pathology, Dermatology    Shave biopsy procedure note x 2    Shave biopsy performed after verbal consent including risk of infection, scar, recurrence, need for additional treatment of site. Area prepped with alcohol, anesthetized with approximately 1.0cc of 1%  lidocaine with epinephrine. Lesional tissue shaved with razor blade. Hemostasis achieved with application of aluminum chloride followed by hyfrecation. No complications. Dressing applied. Wound care explained.      Multiple benign nevi  Seborrheic keratoses  Lentigo  Sebaceous hyperplasia  Reassurance given to patient. No treatment is necessary.   Treatment of benign, asymptomatic lesions may be considered cosmetic.    Screening exam for skin cancer  Total body skin examination performed today including at least 12 points as noted in physical examination. Suspicious lesions noted.    Recommend daily sun protection/avoidance, use of at least SPF 30, broad spectrum sunscreen (OTC drug), skin self examinations, and routine physician surveillance to optimize early detection    Perioral dermatitis  -     tacrolimus (PROTOPIC) 0.1 % ointment; Apply topically 2 (two) times daily. Apply to rash on face  Dispense: 30 g; Refill: 4  -     metroNIDAZOLE (METROGEL) 0.75 % gel; Apply topically 2 (two) times daily. Apply to rash on face  Dispense: 45 g; Refill: 4             Follow up if symptoms worsen or fail to improve.

## 2024-09-13 NOTE — PATIENT INSTRUCTIONS
Shave Biopsy Wound Care    Your doctor has performed a shave biopsy today.  A band aid and vaseline ointment has been placed over the site.  This should remain in place for NO LONGER THAN 48 hours.  It is fine to remove the bandaid after 24 hours, if the area is no longer bleeding. It is recommended that you keep the area dry (do not wet)) for the first 24 hours.  After 24 hours, wash the area with warm soap and water and apply Vaseline jelly.  Many patients prefer to use Neosporin or Bacitracin ointment.  This is acceptable; however, know that you can develop an allergy to this medication even if you have used it safely for years.  It is important to keep the area moist.  Letting it dry out and get air slows healing time, and will worsen the scar.        If you notice increasing redness, tenderness, pain, or yellow drainage at the biopsy site, please notify your doctor.  These are signs of an infection.    If your biopsy site is bleeding, apply firm pressure for 15 minutes straight.  Repeat for another 15 minutes, if it is still bleeding.   If the surgical site continues to bleed, then please contact your doctor.      For [facility] users:   You will receive your biopsy results in [facility] as soon as they are available. Please be assured that your physician/provider will review your results and will then determine what further treatment, evaluation, or planning is required. You should be contacted by your physician's/provider's office within 5 business days of receiving your results; If not, please reach out to directly. This is one more way [employer] is putting you first.     [facility] [place] [address]/ [phone] FAX/ [email or web address]

## 2024-10-01 ENCOUNTER — OFFICE VISIT (OUTPATIENT)
Dept: INTERNAL MEDICINE | Facility: CLINIC | Age: 39
End: 2024-10-01
Payer: COMMERCIAL

## 2024-10-01 DIAGNOSIS — F32.A DEPRESSION, UNSPECIFIED DEPRESSION TYPE: Primary | ICD-10-CM

## 2024-10-01 PROCEDURE — 99213 OFFICE O/P EST LOW 20 MIN: CPT | Mod: S$GLB,,, | Performed by: INTERNAL MEDICINE

## 2024-10-01 PROCEDURE — 3078F DIAST BP <80 MM HG: CPT | Mod: CPTII,S$GLB,, | Performed by: INTERNAL MEDICINE

## 2024-10-01 PROCEDURE — 3008F BODY MASS INDEX DOCD: CPT | Mod: CPTII,S$GLB,, | Performed by: INTERNAL MEDICINE

## 2024-10-01 PROCEDURE — 1159F MED LIST DOCD IN RCRD: CPT | Mod: CPTII,S$GLB,, | Performed by: INTERNAL MEDICINE

## 2024-10-01 PROCEDURE — 3074F SYST BP LT 130 MM HG: CPT | Mod: CPTII,S$GLB,, | Performed by: INTERNAL MEDICINE

## 2024-10-01 PROCEDURE — 99999 PR PBB SHADOW E&M-EST. PATIENT-LVL IV: CPT | Mod: PBBFAC,,, | Performed by: INTERNAL MEDICINE

## 2024-10-01 PROCEDURE — 3044F HG A1C LEVEL LT 7.0%: CPT | Mod: CPTII,S$GLB,, | Performed by: INTERNAL MEDICINE

## 2024-10-01 RX ORDER — VALACYCLOVIR HYDROCHLORIDE 1 G/1
TABLET, FILM COATED ORAL
Qty: 28 TABLET | Refills: 2 | Status: SHIPPED | OUTPATIENT
Start: 2024-10-01

## 2024-10-01 RX ORDER — ESCITALOPRAM OXALATE 10 MG/1
10 TABLET ORAL DAILY
Qty: 90 TABLET | Refills: 1 | Status: SHIPPED | OUTPATIENT
Start: 2024-10-01 | End: 2025-10-01

## 2024-10-05 VITALS
HEIGHT: 65 IN | DIASTOLIC BLOOD PRESSURE: 78 MMHG | WEIGHT: 149.5 LBS | TEMPERATURE: 98 F | OXYGEN SATURATION: 98 % | HEART RATE: 70 BPM | BODY MASS INDEX: 24.91 KG/M2 | SYSTOLIC BLOOD PRESSURE: 118 MMHG

## 2024-10-05 NOTE — PROGRESS NOTES
Subjective:       Patient ID: Josephine Cruz is a 39 y.o. female.    Chief Complaint: Depression    HPI  She complains of depression.  Denies suicidal ideation     Past medical history: Cervical cancer     Medications: See medication list     No known drug allergies       Review of Systems   Constitutional:  Negative for chills, fatigue, fever and unexpected weight change.   Respiratory:  Negative for chest tightness and shortness of breath.    Cardiovascular:  Negative for chest pain and palpitations.   Gastrointestinal:  Negative for abdominal pain and blood in stool.   Neurological:  Negative for dizziness, syncope, numbness and headaches.       Objective:      Physical Exam  HENT:      Right Ear: External ear normal.      Left Ear: External ear normal.      Nose: Nose normal.      Mouth/Throat:      Mouth: Mucous membranes are moist.      Pharynx: Oropharynx is clear.   Eyes:      Pupils: Pupils are equal, round, and reactive to light.   Cardiovascular:      Rate and Rhythm: Normal rate and regular rhythm.      Heart sounds: No murmur heard.  Pulmonary:      Breath sounds: Normal breath sounds.   Abdominal:      General: There is no distension.      Palpations: There is no hepatomegaly or splenomegaly.      Tenderness: There is no abdominal tenderness.   Musculoskeletal:      Cervical back: Normal range of motion.   Lymphadenopathy:      Cervical: No cervical adenopathy.      Upper Body:      Right upper body: No axillary adenopathy.      Left upper body: No axillary adenopathy.   Neurological:      Cranial Nerves: No cranial nerve deficit.      Sensory: No sensory deficit.      Motor: Motor function is intact.      Deep Tendon Reflexes: Reflexes are normal and symmetric.         Assessment/Plan       Depression: Schedule psychiatry appointment.  Lexapro 10 mg daily   She sees a gynecologist outside of [facility]

## 2024-10-10 ENCOUNTER — PATIENT MESSAGE (OUTPATIENT)
Dept: INTERNAL MEDICINE | Facility: CLINIC | Age: 39
End: 2024-10-10
Payer: COMMERCIAL

## 2024-10-10 DIAGNOSIS — N93.9 VAGINAL BLEEDING: Primary | ICD-10-CM

## 2024-10-17 ENCOUNTER — PATIENT MESSAGE (OUTPATIENT)
Dept: SPINE | Facility: CLINIC | Age: 39
End: 2024-10-17
Payer: COMMERCIAL

## 2024-10-18 NOTE — TELEPHONE ENCOUNTER
Staff contacted patient to get her scheduled for a follow up appointment per provider advise. Patent has been successfully scheduled

## 2024-10-22 ENCOUNTER — OFFICE VISIT (OUTPATIENT)
Dept: OBSTETRICS AND GYNECOLOGY | Facility: CLINIC | Age: 39
End: 2024-10-22
Payer: COMMERCIAL

## 2024-10-22 VITALS — HEIGHT: 65 IN | BODY MASS INDEX: 24.87 KG/M2 | DIASTOLIC BLOOD PRESSURE: 84 MMHG | SYSTOLIC BLOOD PRESSURE: 122 MMHG

## 2024-10-22 DIAGNOSIS — Z12.4 CERVICAL CANCER SCREENING: ICD-10-CM

## 2024-10-22 DIAGNOSIS — Z01.419 WELL WOMAN EXAM WITH ROUTINE GYNECOLOGICAL EXAM: Primary | ICD-10-CM

## 2024-10-22 DIAGNOSIS — Z32.02 NEGATIVE PREGNANCY TEST: ICD-10-CM

## 2024-10-22 DIAGNOSIS — N93.9 VAGINAL BLEEDING: ICD-10-CM

## 2024-10-22 LAB
B-HCG UR QL: NEGATIVE
CTP QC/QA: YES

## 2024-10-22 PROCEDURE — 3074F SYST BP LT 130 MM HG: CPT | Mod: CPTII,S$GLB,, | Performed by: OBSTETRICS & GYNECOLOGY

## 2024-10-22 PROCEDURE — 1159F MED LIST DOCD IN RCRD: CPT | Mod: CPTII,S$GLB,, | Performed by: OBSTETRICS & GYNECOLOGY

## 2024-10-22 PROCEDURE — 99999 PR PBB SHADOW E&M-EST. PATIENT-LVL III: CPT | Mod: PBBFAC,,, | Performed by: OBSTETRICS & GYNECOLOGY

## 2024-10-22 PROCEDURE — 3044F HG A1C LEVEL LT 7.0%: CPT | Mod: CPTII,S$GLB,, | Performed by: OBSTETRICS & GYNECOLOGY

## 2024-10-22 PROCEDURE — 3008F BODY MASS INDEX DOCD: CPT | Mod: CPTII,S$GLB,, | Performed by: OBSTETRICS & GYNECOLOGY

## 2024-10-22 PROCEDURE — 81025 URINE PREGNANCY TEST: CPT | Mod: S$GLB,,, | Performed by: OBSTETRICS & GYNECOLOGY

## 2024-10-22 PROCEDURE — 88175 CYTOPATH C/V AUTO FLUID REDO: CPT | Performed by: OBSTETRICS & GYNECOLOGY

## 2024-10-22 PROCEDURE — 3079F DIAST BP 80-89 MM HG: CPT | Mod: CPTII,S$GLB,, | Performed by: OBSTETRICS & GYNECOLOGY

## 2024-10-22 PROCEDURE — 99385 PREV VISIT NEW AGE 18-39: CPT | Mod: S$GLB,,, | Performed by: OBSTETRICS & GYNECOLOGY

## 2024-10-22 RX ORDER — MELOXICAM 15 MG/1
15 TABLET ORAL DAILY PRN
COMMUNITY
End: 2024-10-24 | Stop reason: SDUPTHER

## 2024-10-22 NOTE — PROGRESS NOTES
HISTORY OF PRESENT ILLNESS:    Josephine Cruz is a 39 y.o. female, , Patient's last menstrual period was 10/10/2024 (exact date).,  presents for a routine annual exam . New onset of irregular ,some pain bleeding ,hx conization   Last pap:  3 years    Contraception: condoms.    Sexually transmitted infection risk: very low risk of STD exposure.   Menstrual flow: regular every 28-30 days.bleeding 9 days later and heavy  This is the extent of the patient's complaints at this time.     Past Medical History:   Diagnosis Date    Abnormal Pap smear of cervix     Cervical cancer     Degenerative disc disease, lumbar        Past Surgical History:   Procedure Laterality Date    CERVICAL BIOPSY  W/ LOOP ELECTRODE EXCISION          COLPOSCOPY          DILATION AND CURETTAGE OF UTERUS      2006    INJECTION, SPINE, LUMBOSACRAL, TRANSFORAMINAL APPROACH Bilateral 2024    Procedure: bilat L5-S1 TFESI;  Surgeon: Irina Ann MD;  Location: [facility] PAIN [facility];  Service: Pain Management;  Laterality: Bilateral;  20 mins no ac       MEDICATIONS AND ALLERGIES:      Current Outpatient Medications:     EScitalopram oxalate (LEXAPRO) 10 MG tablet, Take 1 tablet (10 mg total) by mouth once daily., Disp: 90 tablet, Rfl: 1    meclizine (ANTIVERT) 25 mg tablet, Take 1 tablet (25 mg total) by mouth 3 (three) times daily as needed for Dizziness., Disp: 30 tablet, Rfl: 1    meloxicam (MOBIC) 15 MG tablet, Take 15 mg by mouth daily as needed for Pain., Disp: , Rfl:     valACYclovir (VALTREX) 1000 MG tablet, 2 tabs po BID X 1 DAY, Disp: 28 tablet, Rfl: 2    metroNIDAZOLE (METROGEL) 0.75 % gel, Apply topically 2 (two) times daily. Apply to rash on face (Patient not taking: Reported on 10/22/2024), Disp: 45 g, Rfl: 4    tacrolimus (PROTOPIC) 0.1 % ointment, Apply topically 2 (two) times daily. Apply to rash on face (Patient not taking: Reported on 10/22/2024), Disp: 30 g, Rfl: 4    Review of patient's allergies  indicates:  No Known Allergies    Family History   Problem Relation Name Age of Onset    Breast cancer Paternal Grandmother      Colon cancer Paternal Grandmother      Diabetes Maternal Grandfather      Cervical cancer Neg Hx      Uterine cancer Neg Hx      Ovarian cancer Neg Hx         Social History     Socioeconomic History    Marital status:    Tobacco Use    Smoking status: Never    Smokeless tobacco: Never   Substance and Sexual Activity    Alcohol use: Yes     Comment: occasionally    Drug use: Not Currently     Types: Marijuana    Sexual activity: Yes     Partners: Male     Birth control/protection: Condom, See Surgical Hx     Social Drivers of Health     Financial Resource Strain: Medium Risk (2024)    Overall Financial Resource Strain (CARDIA)     Difficulty of Paying Living Expenses: Somewhat hard   Food Insecurity: No Food Insecurity (2024)    Hunger Vital Sign     Worried About Running Out of Food in the Last Year: Never true     Ran Out of Food in the Last Year: Never true   Physical Activity: Sufficiently Active (2024)    Exercise Vital Sign     Days of Exercise per Week: 5 days     Minutes of Exercise per Session: 40 min   Stress: Stress Concern Present (2024)    [employer] - Occupational Stress Questionnaire     Feeling of Stress : Very much   Housing Stability: Unknown (2024)    Housing Stability Vital Sign     Unable to Pay for Housing in the Last Year: No       OB History    Para Term  AB Living   5 4 4   1 4   SAB IAB Ectopic Multiple Live Births   1       4      # Outcome Date GA Lbr Rodney/2nd Weight Sex Type Anes PTL Lv   5 Term //18    M Vag-Spont   JAMES   4 Term /    M Vag-Spont   JAMES   3 Term 10/10/10    M Vag-Spont   JAMES   2 Term 08    M Vag-Spont   JAMES   1 SAB 2006     SAB   FD          COMPREHENSIVE GYN HISTORY:  PAP History: Denies abnormal Paps.  Infection History: Denies STDs. Denies PID.  Benign  "History: Denies uterine fibroids. Denies ovarian cysts. Denies endometriosis. Denies other conditions.  Cancer History: Denies cervical cancer. Denies uterine cancer or hyperplasia. Denies ovarian cancer. Denies vulvar cancer or pre-cancer. Denies vaginal cancer or pre-cancer. Denies breast cancer. Denies colon cancer.      ROS:  GENERAL: No weight changes. No swelling. No fatigue. No fever.  BREASTS: No pain. No lumps. No discharge.  ABDOMEN: No pain. No nausea. No vomiting. No diarrhea. No constipation.  REPRODUCTIVE: No abnormal bleeding. No pelvic pain.   VULVA: No pain. No lesions. No itching.  VAGINA: No relaxation. No itching. No odor. No discharge. No lesions.  URINARY: No incontinence. No nocturia. No frequency. No dysuria.    /84 (BP Location: Right arm, Patient Position: Sitting)   Ht 5' 5" (1.651 m)   LMP 10/10/2024 (Exact Date)   BMI 24.87 kg/m²     PE:  Physical Exam   Vitals signs and nursing note reviewed.   Constitutional:       Appearance: Normal appearance.   HENT:      Head: Normocephalic and atraumatic.      Nose: Nose normal.      Mouth/Throat:      Mouth: Mucous membranes are moist.   Eyes:      Conjunctiva/sclera: Conjunctivae normal.   Neck:      Musculoskeletal: Normal range of motion.   Pulmonary:      Effort: Pulmonary effort is normal.   Breast: no masses palpated  bilateral    Abdominal:      Palpations: Abdomen is soft.   Genitourinary:     General: Normal vulva.      Vagina: No signs of injury and foreign body. No vaginal discharge, erythema, tenderness, bleeding, lesions or prolapsed vaginal walls.      Cervix: Normal. Pap taken     Uterus: Normal.       Adnexa: Right adnexa normal and left adnexa normal.      Musculoskeletal: Normal range of motion.   Skin:     General: Skin is warm and dry.   Neurological:      General: No focal deficit present.      Mental Status: She is alert.   Psychiatric:         Mood and Affect: Mood normal.         Behavior: Behavior normal.         " Thought Content: Thought content normal.         Judgment: Judgment normal.    PROCEDURES/ORDERS:  Pap      Assessment/Plan:    Well woman exam with routine gynecological exam    Vaginal bleeding  -     Ambulatory referral/consult to Obstetrics / Gynecology    Cervical cancer screening    Negative pregnancy test  -     POCT urine pregnancy        Orders Placed This Encounter    POCT urine pregnancy        COUNSELING:  The patient was counseled today on:  -A.C.S. Pap and pelvic exam guidelines, recomendations for yearly mammogram, monthly self breast exams and to follow up with her PCP for other health maintenance.    FOLLOW-UP  annually for [employer].

## 2024-10-23 LAB
CLINICAL INFO: NORMAL
DATE OF PREVIOUS PAP: NORMAL
DATE PREVIOUS BX: YES
LMP START DATE: NORMAL
SPECIMEN SOURCE CVX/VAG CYTO: NORMAL

## 2024-10-24 ENCOUNTER — OFFICE VISIT (OUTPATIENT)
Dept: SPINE | Facility: CLINIC | Age: 39
End: 2024-10-24
Payer: COMMERCIAL

## 2024-10-24 ENCOUNTER — HOSPITAL ENCOUNTER (OUTPATIENT)
Dept: RADIOLOGY | Facility: OTHER | Age: 39
Discharge: HOME | End: 2024-10-24
Attending: OBSTETRICS & GYNECOLOGY
Payer: COMMERCIAL

## 2024-10-24 VITALS
OXYGEN SATURATION: 100 % | SYSTOLIC BLOOD PRESSURE: 130 MMHG | DIASTOLIC BLOOD PRESSURE: 66 MMHG | RESPIRATION RATE: 18 BRPM | WEIGHT: 149.69 LBS | HEIGHT: 65 IN | BODY MASS INDEX: 24.94 KG/M2 | HEART RATE: 66 BPM

## 2024-10-24 DIAGNOSIS — M51.362 DEGENERATION OF INTERVERTEBRAL DISC OF LUMBAR REGION WITH DISCOGENIC BACK PAIN AND LOWER EXTREMITY PAIN: Primary | ICD-10-CM

## 2024-10-24 DIAGNOSIS — M51.16 LUMBAR DISC DISEASE WITH RADICULOPATHY: ICD-10-CM

## 2024-10-24 DIAGNOSIS — N93.9 VAGINAL BLEEDING: ICD-10-CM

## 2024-10-24 PROCEDURE — 1160F RVW MEDS BY RX/DR IN RCRD: CPT | Mod: CPTII,S$GLB,, | Performed by: STUDENT IN AN ORGANIZED HEALTH CARE EDUCATION/TRAINING PROGRAM

## 2024-10-24 PROCEDURE — 99999 PR PBB SHADOW E&M-EST. PATIENT-LVL IV: CPT | Mod: PBBFAC,,, | Performed by: STUDENT IN AN ORGANIZED HEALTH CARE EDUCATION/TRAINING PROGRAM

## 2024-10-24 PROCEDURE — 76830 TRANSVAGINAL US NON-OB: CPT | Mod: 26,,, | Performed by: RADIOLOGY

## 2024-10-24 PROCEDURE — 76856 US EXAM PELVIC COMPLETE: CPT | Mod: TC

## 2024-10-24 PROCEDURE — 3008F BODY MASS INDEX DOCD: CPT | Mod: CPTII,S$GLB,, | Performed by: STUDENT IN AN ORGANIZED HEALTH CARE EDUCATION/TRAINING PROGRAM

## 2024-10-24 PROCEDURE — 76856 US EXAM PELVIC COMPLETE: CPT | Mod: 26,,, | Performed by: RADIOLOGY

## 2024-10-24 PROCEDURE — 3078F DIAST BP <80 MM HG: CPT | Mod: CPTII,S$GLB,, | Performed by: STUDENT IN AN ORGANIZED HEALTH CARE EDUCATION/TRAINING PROGRAM

## 2024-10-24 PROCEDURE — 3075F SYST BP GE 130 - 139MM HG: CPT | Mod: CPTII,S$GLB,, | Performed by: STUDENT IN AN ORGANIZED HEALTH CARE EDUCATION/TRAINING PROGRAM

## 2024-10-24 PROCEDURE — 3044F HG A1C LEVEL LT 7.0%: CPT | Mod: CPTII,S$GLB,, | Performed by: STUDENT IN AN ORGANIZED HEALTH CARE EDUCATION/TRAINING PROGRAM

## 2024-10-24 PROCEDURE — 1159F MED LIST DOCD IN RCRD: CPT | Mod: CPTII,S$GLB,, | Performed by: STUDENT IN AN ORGANIZED HEALTH CARE EDUCATION/TRAINING PROGRAM

## 2024-10-24 PROCEDURE — 99214 OFFICE O/P EST MOD 30 MIN: CPT | Mod: S$GLB,,, | Performed by: STUDENT IN AN ORGANIZED HEALTH CARE EDUCATION/TRAINING PROGRAM

## 2024-10-24 RX ORDER — MELOXICAM 15 MG/1
15 TABLET ORAL DAILY PRN
Qty: 30 TABLET | Refills: 0 | Status: SHIPPED | OUTPATIENT
Start: 2024-10-24 | End: 2024-11-23

## 2024-10-24 RX ORDER — MELOXICAM 15 MG/1
15 TABLET ORAL DAILY PRN
Qty: 30 TABLET | Refills: 0 | Status: SHIPPED | OUTPATIENT
Start: 2024-10-24 | End: 2024-10-24

## 2024-10-24 NOTE — PROGRESS NOTES
Chronic Pain - Established Patient    Referring Physician: No ref. provider found    Chief Complaint:   Chief Complaint   Patient presents with    Low-back Pain     Interval History 10/24/24:  Josephine Cruz presents for follow up. On 10/16/24 she had an incident where she was playing football with her kids. She felt a popping sensation in her back and for the next few days following she was unable to walk. She reports R sided back pain. This is the same pain that she presented with initially however more intense. She states ice helped more than heat. Taking mobic and Tylenol around the clock. Reports a few episodes of L arm tingling that resolved on its own. She states the pain resolved with rest. Reports tingling in feet but no weakness. The pain would start in the R mid back and travel all the way down the back of the R leg to the toes.      INITIAL HPI 06/27/24 :    Josephine Cruz presents to the clinic for the evaluation of lower back and bilateral lower extremity pain. The pain started over 10 years ago following no inciting event and symptoms have been worsening.The pain is located in the lower back area and radiates to the bilateral feet (prior sciatica, now just tingling calves/feet).  The pain is described as  solid, aching, occasionally sharp (causes her to use a cane)  and is rated as 2/10. The pain is rated with a score of  2/10 on the BEST day and a score of 9/10 on the WORST day.  Symptoms interfere with daily activity and sleeping. The pain is exacerbated by Coughing/Sneezing, Getting out of bed/chair, and kids coming to hug.  The pain is mitigated by inversion table (hurts at first, but alleviates pain for about 20 minutes). The patient reports 4 hours of uninterrupted sleep per night.    Patient denies urinary incontinence, bowel incontinence, and significant motor weakness.  She admits to vestibular neuritis (vertigo worsens with pain).  She also reports neck pain, but feels the back  pain is worse.    Physical Therapy/Home Exercise: yes, saw PT 3 years ago.    Went to a few sessions and did not see any benefit  Home exercises/stretching     Pain Disability Index Review:      6/27/2024     1:08 PM   Last 3 PDI Scores   Pain Disability Index (PDI) 42       Pain Medications:   - gabapentin prn   - meloxicam prn   - tylenol      report:  Reviewed and consistent with medication use as prescribed.    Pain Procedures:   07/2024 bilateral L5-S1 TFESI, no relief    Imaging:   MRI LUMBAR SPINE WITHOUT CONTRAST     CLINICAL HISTORY:  Low back pain, symptoms persist with > 6wks conservative treatment;Lumbar radiculopathy, symptoms persist with conservative treatment; Dorsalgia, unspecified     TECHNIQUE:  Multiplanar, multisequence MR images were acquired from the thoracolumbar junction to the sacrum without the administration of contrast.     COMPARISON:  X-ray lumbar spine dated 06/29/2024.     FINDINGS:  There is minimal retrolisthesis of L5 on S1.  The remainder of the lumbar alignment is within normal limits.     There is a probable chronic injury along the anterior superior endplate of the L5 vertebral body.  The vertebral body heights are otherwise maintained.  The bone marrow signal is within normal limits.     The conus ends at the level of L1.  The cauda equina nerve roots are within normal limits.     There is hypertrophy of the posterior elements.  There is multilevel disc desiccation.  Evaluation of the individual disc levels reveals the following:     L1-L2, normal.     L2-L3, there is a disc bulge along with facet hypertrophy and ligamentum flavum hypertrophy.  There is a right foraminal disc protrusion.  The spinal canal is within normal limits.  There is moderate right-sided neural foraminal narrowing.  The left neural foramina is unremarkable.     L3-L4, there is diffuse disc bulge along with facet hypertrophy and ligamentum flavum hypertrophy.  The spinal canal is within normal limits.   The neural foramina is unremarkable.     L4-L5, there is diffuse disc bulge along with facet hypertrophy and ligamentum flavum hypertrophy.  There is superimposed central disc protrusion.  There is marked narrowing of both lateral recesses.  There is mild spinal canal narrowing.  There is mild bilateral neural foraminal narrowing.     L5-S1, there is diffuse disc bulge along with facet hypertrophy and ligamentum flavum hypertrophy.  There is a posterior annular fissure.  There is central disc protrusion.  There is mild spinal canal narrowing.  There is marked narrowing the lateral recesses.  There is mild bilateral neural foraminal narrowing.     The paraspinal soft tissues are within normal limits.     Impression:     Advanced degenerative changes in the lower lumbar spine at the L4-L5 and L5-S1 levels with central disc protrusions resulting in marked narrowing of the lateral recesses.  No significant central spinal canal narrowing.     Right foraminal disc protrusion at the L2-L3 level resulting in moderate narrowing of the right neural foramina at the L2-L3 level.     Additional multilevel degenerative changes as above.        Electronically signed by:Jeremias Jacome MD  Date:                                            06/29/2024  Time:                                           20:56    X-ray, Lumbosacral, 2 or 3 Views  CLINICAL INDICATION  Back pain  COMPARISON  No relevant imaging examinations are available for review.  PROCEDURE DETAILS  AP, lateral and lumbosacral views  FINDINGS  The lumbar spine demonstrates normal alignment and vertebral body height.  No fracture or  dislocation. Multilevel degenerative changes of the spine are present with degenerative disc  disease, marginal osteophytes and facet joint arthropathy, most apparent at L4-L5, L5-S1. Limbus  vertebrae favored over avulsion fracture at the anterior superior aspect of the L5 of vertebral  margin with corticated wedge-shaped ossific density.   Dextroscoliosis. The soft tissues are normal.  IMPRESSION  1.  Limbus vertebrae favored over avulsion fracture at the anterior superior margin of the L5  vertebral body. MRI lumbar spine would be helpful for further evaluation confirmation.  2.  Degenerative changes lumbar spine  Signature  Electronically Signed:   Jackie Dobbins M.D. on 10-, 02:04 PM  Jackie Dobbins  10- 02:04 PM    XRAY L SPINE 6/29/24  EXAMINATION:  XR LUMBAR SPINE AP AND LAT WITH FLEX/EXT     CLINICAL HISTORY:  Other intervertebral disc degeneration, lumbar region     TECHNIQUE:  AP and lateral views as well as lateral flexion and extension images are performed through the lumbar spine.     COMPARISON:  None     FINDINGS:  Slight dextrocurvature to the lumbar spine.  L5 limbus vertebrae noted, normal variant.  There is mild disc height loss at L5-S1.  No fractures.  No marrow replacement process.  SI joints unremarkable.  No subluxation with extension and flexion views.     Impression:     No acute findings.    6/27/24  EXAMINATION:  XR CERVICAL SPINE 2 OR 3 VIEWS     CLINICAL HISTORY:  Cervicalgia     TECHNIQUE:  AP, lateral and open mouth views of the cervical spine were performed.     COMPARISON:  None.     FINDINGS:  Vertebral body heights are maintained.     Disc spaces are maintained.  Small posterior osteophytes C5-6 and C6-7.     Straightening of the normal cervical lordosis.  Otherwise, AP alignment is anatomic.     No prevertebral soft tissue edema.     Impression:     No acute osseous abnormality seen.  Mild degenerative change C5-6 and C6-7.           Past Medical History:   Diagnosis Date    Abnormal Pap smear of cervix     Cervical cancer     Degenerative disc disease, lumbar      Past Surgical History:   Procedure Laterality Date    CERVICAL BIOPSY  W/ LOOP ELECTRODE EXCISION      2015    COLPOSCOPY      2015    DILATION AND CURETTAGE OF UTERUS      2006    INJECTION, SPINE, LUMBOSACRAL, TRANSFORAMINAL APPROACH  Bilateral 07/26/2024    Procedure: bilat L5-S1 TFESI;  Surgeon: Irina Ann MD;  Location: [facility];  Service: Pain Management;  Laterality: Bilateral;  20 mins no ac     Social History     Socioeconomic History    Marital status:    Tobacco Use    Smoking status: Never    Smokeless tobacco: Never   Substance and Sexual Activity    Alcohol use: Yes     Comment: occasionally    Drug use: Not Currently     Types: Marijuana    Sexual activity: Yes     Partners: Male     Birth control/protection: Condom, See Surgical Hx     Social Drivers of Health     Financial Resource Strain: Medium Risk (6/17/2024)    Overall Financial Resource Strain (CARDIA)     Difficulty of Paying Living Expenses: Somewhat hard   Food Insecurity: No Food Insecurity (6/17/2024)    Hunger Vital Sign     Worried About Running Out of Food in the Last Year: Never true     Ran Out of Food in the Last Year: Never true   Physical Activity: Sufficiently Active (6/17/2024)    Exercise Vital Sign     Days of Exercise per Week: 5 days     Minutes of Exercise per Session: 40 min   Stress: Stress Concern Present (6/17/2024)    [employer] [place] [employer] - Occupational Stress Questionnaire     Feeling of Stress : Very much   Housing Stability: Unknown (6/17/2024)    Housing Stability Vital Sign     Unable to Pay for Housing in the Last Year: No     Family History   Problem Relation Name Age of Onset    Breast cancer Paternal Grandmother      Colon cancer Paternal Grandmother      Diabetes Maternal Grandfather      Cervical cancer Neg Hx      Uterine cancer Neg Hx      Ovarian cancer Neg Hx         Review of patient's allergies indicates:  No Known Allergies    Current Outpatient Medications   Medication Sig    EScitalopram oxalate (LEXAPRO) 10 MG tablet Take 1 tablet (10 mg total) by mouth once daily.    meclizine (ANTIVERT) 25 mg tablet Take 1 tablet (25 mg total) by mouth 3 (three) times daily as needed for Dizziness.     "metroNIDAZOLE (METROGEL) 0.75 % gel Apply topically 2 (two) times daily. Apply to rash on face    tacrolimus (PROTOPIC) 0.1 % ointment Apply topically 2 (two) times daily. Apply to rash on face    valACYclovir (VALTREX) 1000 MG tablet 2 tabs po BID X 1 DAY    meloxicam (MOBIC) 15 MG tablet Take 1 tablet (15 mg total) by mouth daily as needed for Pain.     No current facility-administered medications for this visit.       REVIEW OF SYSTEMS:    GENERAL:  current fevers or chills, recent use of antibiotics denies.  HEENT:  History of migraines/headaches: reports history of migraines, History of major throat surgery: denies  RESPIRATORY:  History of home oxygen or pulmonary hypertension/severe breathing dysfunction: denies  CARDIOVASCULAR:  Hx of palpitations/rhythm problems: denies Hx of Heart Attacks/Surgery: denies  GI:  Recent abdominal discomfort or recent change in bowel habits denies  MUSCULOSKELETAL:  See HPI.  SKIN:  unhealed wounds or rashes: denies  PSYCH: major psychiatric history or recent psychosocial stressors reports anxiety and periodic depression (3-4 days every few months)  HEMATOLOGY/LYMPHOLOGY:  Hx of prolonged bleeding, Hx of Blood thinner usage: denies  NEURO:   history of seizures, strokes, chronic/old weakness (such as paralysis or paresis of any body part): denies  All other reviewed and negative other than HPI.    OBJECTIVE:    /66 (BP Location: Right arm, Patient Position: Sitting)   Pulse 66   Resp 18   Ht 5' 5" (1.651 m)   Wt 67.9 kg (149 lb 11.1 oz)   LMP 10/10/2024 (Exact Date)   SpO2 100%   BMI 24.91 kg/m²     PHYSICAL EXAMINATION:  General appearance: Well appearing, in no acute distress, alert and appropriately communicative.  Psych:  Mood and affect appropriate.  Skin: Skin color, texture, turgor normal, no rashes or lesions, in both upper and lower body for exposed skin.  Head/face:  Atraumatic, normocephalic.  Neck:   Cor: regular rate  Pulm: non-labored breathing  GI: " Abdomen non-distended and non-tender.  Back: Straight leg raising in the sitting and supine positions is positive to radicular pain on the right. No pain to palpation over the spine or paraspinal muscles. Negative facet loading. + Millgrams. Negative Doug bilaterally  Extremities: No deformities, significant lymphedema, or skin discoloration. No significant open wounds. No major amputations.  Musculoskeletal:  Bilateral GTB tenderness. L SI joint tenderness. Bilateral upper and lower extremity strength is normal and symmetric.  No atrophy or tone abnormalities are noted.  Neuro: Bilateral upper and lower extremity coordination and muscle stretch reflexes abnormalities noted: none.    Gait: normal    CMP  Sodium   Date Value Ref Range Status   06/18/2024 137 136 - 145 mmol/L Final     Potassium   Date Value Ref Range Status   06/18/2024 4.7 3.5 - 5.1 mmol/L Final     Chloride   Date Value Ref Range Status   06/18/2024 105 95 - 110 mmol/L Final     CO2   Date Value Ref Range Status   06/18/2024 23 23 - 29 mmol/L Final     Glucose   Date Value Ref Range Status   06/18/2024 88 70 - 110 mg/dL Final     BUN   Date Value Ref Range Status   06/18/2024 17 6 - 20 mg/dL Final     Creatinine   Date Value Ref Range Status   06/18/2024 0.8 0.5 - 1.4 mg/dL Final     Calcium   Date Value Ref Range Status   06/18/2024 9.2 8.7 - 10.5 mg/dL Final     Total Protein   Date Value Ref Range Status   06/18/2024 7.4 6.0 - 8.4 g/dL Final     Albumin   Date Value Ref Range Status   06/18/2024 4.2 3.5 - 5.2 g/dL Final     Total Bilirubin   Date Value Ref Range Status   06/18/2024 0.4 0.1 - 1.0 mg/dL Final     Comment:     For infants and newborns, interpretation of results should be based  on gestational age, weight and in agreement with clinical  observations.    Premature Infant recommended reference ranges:  Up to 24 hours.............<8.0 mg/dL  Up to 48 hours............<12.0 mg/dL  3-5 days..................<15.0 mg/dL  6-29  days.................<15.0 mg/dL       Alkaline Phosphatase   Date Value Ref Range Status   06/18/2024 42 (L) 55 - 135 U/L Final     AST   Date Value Ref Range Status   06/18/2024 16 10 - 40 U/L Final     ALT   Date Value Ref Range Status   06/18/2024 11 10 - 44 U/L Final     Anion Gap   Date Value Ref Range Status   06/18/2024 9 8 - 16 mmol/L Final     eGFR   Date Value Ref Range Status   06/18/2024 >60.0 >60 mL/min/1.73 m^2 Final       ASSESSMENT: 39 y.o. year old female with chronic pain, consistent with:    1. Degeneration of intervertebral disc of lumbar region with discogenic back pain and lower extremity pain        2. Lumbar disc disease with radiculopathy  Ambulatory referral/consult to [facility]    meloxicam (MOBIC) 15 MG tablet    DISCONTINUED: meloxicam (MOBIC) 15 MG tablet          IMPRESSION: Josephine Cruz presents today for chronic lower back pain, as well as neck pain. History and physical exam are consistent with axial lower back pain/vertebrogenic low back pain.  My independent interpretation of the imaging is consistent with lumbar degenerative disc disease with lumbus vertebra at L5. She also has a posterior annular fissure at L5/S1.  She has failed TF MAINOR and conservative treatment. Due to patient having more issues with flexion, coughing/sneezing, standing for long periods of time, likely vertebrogenic/discogenic pain.     PLAN:   - I have stressed the importance of physical activity and a home exercise plan to help with pain and improve health.  - Patient can continue with medications for now since they are providing benefits, using them appropriately, and without side effects.  - she is set to start physical therapy next week  - Intracept and Vidisc considered but likely declined by [employer]  - Discussed PRP, need discogram first however  - referral to [facility] program  - mobic refill provided today  - patient not having consistent pain, so will wait until pain is consistent  we will order L5/S1 discogram in consideration for L5/S1 PRP  - Counseled patient regarding the importance of activity modification and physical therapy.    The above plan and management options were discussed at length with patient. Patient is in agreement with the above and verbalized understanding. It will be communicated with the referring physician via electronic record, fax, or mail.    Sravanthi Grady MD  10/24/2024     I have personally reviewed the history and exam of this patient and agree with the student/resident/fellow/NPs note as stated above.  I have seen the patient and personally examined them as appropriate.  I have personally discussed the plan of care with the provider and patient and have reviewed and/or edited the plan of care as appropriate.  All questions have been answered to the best of my ability.    Irina Ann MD PhD

## 2024-10-25 ENCOUNTER — TELEPHONE (OUTPATIENT)
Dept: OBSTETRICS AND GYNECOLOGY | Facility: CLINIC | Age: 39
End: 2024-10-25
Payer: COMMERCIAL

## 2024-10-25 NOTE — TELEPHONE ENCOUNTER
----- Message from Jerry Mcbride MD sent at 10/25/2024 10:07 AM CDT -----  Ultrasound is normal but shows a hemorrhagic cyst which should resolve and if pain and irreg continues please come back to re evaluate

## 2024-10-25 NOTE — TELEPHONE ENCOUNTER
I called pt to go over results per Dr. Mcbride. Pt verbalized understanding to these results and will call back if needed.

## 2024-10-30 ENCOUNTER — OFFICE VISIT (OUTPATIENT)
Dept: PSYCHIATRY | Facility: CLINIC | Age: 39
End: 2024-10-30
Payer: COMMERCIAL

## 2024-10-30 ENCOUNTER — PATIENT MESSAGE (OUTPATIENT)
Dept: OBSTETRICS AND GYNECOLOGY | Facility: CLINIC | Age: 39
End: 2024-10-30
Payer: COMMERCIAL

## 2024-10-30 DIAGNOSIS — F41.1 GENERALIZED ANXIETY DISORDER: Primary | ICD-10-CM

## 2024-10-30 DIAGNOSIS — F60.5 OBSESSIVE COMPULSIVE PERSONALITY DISORDER: ICD-10-CM

## 2024-10-30 DIAGNOSIS — F31.81 BIPOLAR II DISORDER: ICD-10-CM

## 2024-10-30 PROCEDURE — 90792 PSYCH DIAG EVAL W/MED SRVCS: CPT | Mod: S$GLB,,,

## 2024-10-30 PROCEDURE — 99999 PR PBB SHADOW E&M-EST. PATIENT-LVL I: CPT | Mod: PBBFAC,,,

## 2024-10-30 PROCEDURE — 3044F HG A1C LEVEL LT 7.0%: CPT | Mod: CPTII,S$GLB,,
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
## 2024-11-21 ENCOUNTER — OFFICE VISIT (OUTPATIENT)
Dept: PRIMARY CARE CLINIC | Facility: CLINIC | Age: 39
End: 2024-11-21
Payer: COMMERCIAL

## 2024-11-21 VITALS
OXYGEN SATURATION: 98 % | BODY MASS INDEX: 24.99 KG/M2 | HEIGHT: 65 IN | TEMPERATURE: 98 F | WEIGHT: 150 LBS | HEART RATE: 91 BPM | DIASTOLIC BLOOD PRESSURE: 75 MMHG | SYSTOLIC BLOOD PRESSURE: 111 MMHG

## 2024-11-21 DIAGNOSIS — J02.9 SORE THROAT: ICD-10-CM

## 2024-11-21 DIAGNOSIS — J01.00 ACUTE NON-RECURRENT MAXILLARY SINUSITIS: Primary | ICD-10-CM

## 2024-11-21 PROCEDURE — 3044F HG A1C LEVEL LT 7.0%: CPT | Mod: CPTII,S$GLB,,

## 2024-11-21 PROCEDURE — 3074F SYST BP LT 130 MM HG: CPT | Mod: CPTII,S$GLB,,

## 2024-11-21 PROCEDURE — 3078F DIAST BP <80 MM HG: CPT | Mod: CPTII,S$GLB,,

## 2024-11-21 PROCEDURE — 99214 OFFICE O/P EST MOD 30 MIN: CPT | Mod: S$GLB,,,

## 2024-11-21 PROCEDURE — 3008F BODY MASS INDEX DOCD: CPT | Mod: CPTII,S$GLB,,

## 2024-11-21 PROCEDURE — 99999 PR PBB SHADOW E&M-EST. PATIENT-LVL IV: CPT | Mod: PBBFAC,,,

## 2024-11-21 PROCEDURE — 1159F MED LIST DOCD IN RCRD: CPT | Mod: CPTII,S$GLB,,

## 2024-11-21 PROCEDURE — 1160F RVW MEDS BY RX/DR IN RCRD: CPT | Mod: CPTII,S$GLB,,

## 2024-11-21 RX ORDER — DEXAMETHASONE SODIUM PHOSPHATE 4 MG/ML
4 INJECTION, SOLUTION INTRA-ARTICULAR; INTRALESIONAL; INTRAMUSCULAR; INTRAVENOUS; SOFT TISSUE
Status: DISCONTINUED | OUTPATIENT
Start: 2024-11-21 | End: 2024-12-08

## 2024-11-21 RX ORDER — AMOXICILLIN AND CLAVULANATE POTASSIUM 875; 125 MG/1; MG/1
1 TABLET, FILM COATED ORAL EVERY 12 HOURS
Qty: 14 TABLET | Refills: 0 | Status: SHIPPED | OUTPATIENT
Start: 2024-11-21 | End: 2024-11-28

## 2024-11-21 NOTE — PROGRESS NOTES
SUBJECTIVE     Chief Complaint   Patient presents with    Sore Throat    Headache    Generalized Body Aches       HPI  Josephine Cruz is a 39 y.o. female with no significant past medical history that presents for evaluation of flu-like symptoms.     HPI     History of Present Illness    CHIEF COMPLAINT:  Patient presents today for evaluation of flu-like symptoms.    FLU-LIKE SYMPTOMS:  She reports flu-like symptoms that began yesterday afternoon. Symptoms include fever (highest temperature 100.5°F three hours post-Tylenol), body aches, sore throat (improved without medication), headache behind the eyes, vertigo, mild nausea, ear pain, and sinus pressure (particularly on one side). She denies vomiting, diarrhea, or any other symptoms.    ALLERGIES:  She experiences frequent sneezing, particularly during house cleaning activities, which she attributes to allergies. Zyrtec usually alleviates these symptoms.    MEDICATIONS:  She is currently taking Zyrtec for allergies and Tylenol for body aches and fever. She denies any medication allergies.    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    GASTROINTESTINAL:  She reports mild nausea but denies vomiting. She notes her stools are always soft but denies diarrhea.      ROS:  General: +fever, -chills, -fatigue, -weight gain, -weight loss  Eyes: -vision changes, -redness, -discharge  ENT: +ear pain, -nasal congestion, +sore throat  Cardiovascular: -chest pain, -palpitations, -lower extremity edema  Respiratory: -cough, -shortness of breath  Gastrointestinal: -abdominal pain, +nausea, -vomiting, -diarrhea, -constipation, -blood in stool  Genitourinary: -dysuria, -hematuria, -frequency  Musculoskeletal: -joint pain, -muscle pain  Skin: -rash, -lesion  Neurological: +headache, -dizziness,  "-numbness, -tingling  Psychiatric: -anxiety, -depression, -sleep difficulty  Allergic: -allergic reactions         PAST MEDICAL HISTORY:  Past Medical History:   Diagnosis Date    Abnormal Pap smear of cervix     Cervical cancer     Degenerative disc disease, lumbar        ALLERGIES AND MEDICATIONS: updated and reviewed.  Review of patient's allergies indicates:  No Known Allergies  Current Outpatient Medications   Medication Sig Dispense Refill    meclizine (ANTIVERT) 25 mg tablet Take 1 tablet (25 mg total) by mouth 3 (three) times daily as needed for Dizziness. 30 tablet 1    meloxicam (MOBIC) 15 MG tablet Take 1 tablet (15 mg total) by mouth daily as needed for Pain. 30 tablet 0    metroNIDAZOLE (METROGEL) 0.75 % gel Apply topically 2 (two) times daily. Apply to rash on face 45 g 4    tacrolimus (PROTOPIC) 0.1 % ointment Apply topically 2 (two) times daily. Apply to rash on face 30 g 4    valACYclovir (VALTREX) 1000 MG tablet 2 tabs po BID X 1 DAY 28 tablet 2    amoxicillin-clavulanate 875-125mg (AUGMENTIN) 875-125 mg per tablet Take 1 tablet by mouth every 12 (twelve) hours. for 7 days 14 tablet 0    EScitalopram oxalate (LEXAPRO) 10 MG tablet Take 1 tablet (10 mg total) by mouth once daily. (Patient not taking: Reported on 11/21/2024) 90 tablet 1     Current Facility-Administered Medications   Medication Dose Route Frequency Provider Last Rate Last Admin    dexAMETHasone injection 4 mg  4 mg Intramuscular 1 time in Clinic/HOD          OBJECTIVE     Physical Exam  Vitals:    11/21/24 1428   BP: 111/75   Pulse: 91   Temp: 98.4 °F (36.9 °C)    Body mass index is 24.97 kg/m².  Weight: 68.1 kg (150 lb 0.4 oz)   Height: 5' 5" (165.1 cm)     Physical Exam  HENT:      Nose:      Right Sinus: Maxillary sinus tenderness present.      Left Sinus: Maxillary sinus tenderness present.      Mouth/Throat:      Pharynx: Posterior oropharyngeal erythema and postnasal drip present.   Lymphadenopathy:      Cervical: No cervical " adenopathy.       Physical Exam    General: No acute distress. Well-developed. Well-nourished.  Eyes: EOMI. Sclerae anicteric.  Ears: Bilateral TMs clear. Bilateral EACs clear. Mild fluid in right ear.  Cardiovascular: Regular rate. Regular rhythm. No murmurs. No rubs. No gallops. Normal S1, S2.  Respiratory: Normal respiratory effort. Clear to auscultation bilaterally. No rales. No rhonchi. No wheezing. Good air exchange in lungs.  Abdomen: Soft. Non-tender. Non-distended. Normoactive bowel sounds.  Musculoskeletal: No  obvious deformity.  Extremities: No lower extremity edema.  Neurological: Alert & oriented x3. No slurred speech. Normal gait.  Psychiatric: Normal mood. Normal affect. Good insight. Good judgment.  Skin: Warm. Dry. No rash.         Health Maintenance         Date Due Completion Date    Influenza Vaccine (1) 06/30/2025 (Originally 9/1/2024) ---    COVID-19 Vaccine (1 - 2024-25 season) 11/21/2025 (Originally 9/1/2024) ---    Cervical Cancer Screening 10/22/2027 10/22/2024    TETANUS VACCINE 06/18/2034 6/18/2024    RSV Vaccine (Age 60+ and Pregnant patients) (1 - 1-dose 75+ series) 05/21/2060 ---            ASSESSMENT     39 y.o. female with     1. Acute non-recurrent maxillary sinusitis    2. Sore throat        PLAN:     1. Acute non-recurrent maxillary sinusitis  Explained usual causes of sinusitis.  I have reviewed possible causes and most antibiotics that will be prescribed are to only be taken if the duration of disease on my treatment is >10 days or if patient runs consistent fever.  The mainstay of treatment is still increased water, good rest and time.  The goal of antibiotics and steroids is reserved to try to increase function and avoid work time loss.  Patient voiced understanding.   - amoxicillin-clavulanate 875-125mg (AUGMENTIN) 875-125 mg per tablet; Take 1 tablet by mouth every 12 (twelve) hours. for 7 days  Dispense: 14 tablet; Refill: 0      2. Sore throat  Ordered labs to check  levels. Will treat accordingly once labs are reviewed.    - POCT rapid strep A  - SARS Coronavirus 2 Antigen, POCT Manual Read  - POCT Influenza A/B Molecular  (ALL TEST RESULTED NEGATIVE/NORMAL)      Assessment & Plan    Suspected viral infection based on symptoms and negative COVID/flu tests  Considered possibility of strep throat; awaiting test results  Noted potential sinusitis due to reported pressure and ear fluid  Will prescribe Augmentin as precautionary measure, considering patient's occupation and upcoming travel plans      ACUTE SINUSITIS:  - Explained the current prevalence of viral infections, especially in school settings.  - Discussed the possibility of early-stage COVID that may not yet be detectable.  - Patient to rest and hydrate.  - Patient to monitor for worsening symptoms.  - Started Augmentin (to be picked up if patient feels it is necessary).    - Recommend OTC medications: Tylenol or Motrin for fever and body aches, Theraflu tea with fresh karen, lime, and honey for symptom relief, Flonase if feeling congested.    FATIGUE AND MYALGIA:  - Reviewed the importance of vitamin supplementation for immune support.  - Advised to start zinc, vitamin D3, iron, and magnesium supplements (suggested looking for a combination product like Zicam).    ALLERGIES:  - Patient to continue with allergy management practices.  - Continued Zyrtec for allergy management.    STREP THROAT   - Strep test ordered (results pending). Result negative.     FOLLOW-UP:  - Follow up if symptoms worsen or new concerns arise.         JOELLEN Espinal  [facility]  11/21/2024 2:43 PM    I spent a total of 30 minutes on the day of the visit.This includes face to face time and non-face to face time preparing to see the patient (eg, review of tests), obtaining and/or reviewing separately obtained history, documenting clinical information in the electronic or other health record, independently interpreting results  and communicating results to the patient/family/caregiver, or care coordinator.     Follow up if symptoms worsen or fail to improve.    This note was generated with the assistance of ambient listening technology. Verbal consent was obtained by the patient and accompanying visitor(s) for the recording of patient appointment to facilitate this note. I attest to having reviewed and edited the generated note for accuracy, though some syntax or spelling errors may persist. Please contact the author of this note for any clarification.

## 2024-11-21 NOTE — LETTER
November 21, 2024      [facility]  [address] [place] [id number]       Patient: Josephine Cruz   YOB: 1985  Date of Visit: 11/21/2024    To Whom It May Concern:    Remi Cruz  was at [facility] on 11/21/2024. The patient may return to work on 11/22/2024 with no restrictions. If you have any questions or concerns, or if I can be of further assistance, please do not hesitate to contact me.    Sincerely,       JOELLEN Jaime

## 2024-12-18 ENCOUNTER — OFFICE VISIT (OUTPATIENT)
Dept: PSYCHIATRY | Facility: CLINIC | Age: 39
End: 2024-12-18
Payer: COMMERCIAL

## 2024-12-18 VITALS
BODY MASS INDEX: 24.89 KG/M2 | HEIGHT: 65 IN | HEART RATE: 81 BPM | WEIGHT: 149.38 LBS | DIASTOLIC BLOOD PRESSURE: 79 MMHG | SYSTOLIC BLOOD PRESSURE: 131 MMHG

## 2024-12-18 DIAGNOSIS — F60.5 OBSESSIVE COMPULSIVE PERSONALITY DISORDER: ICD-10-CM

## 2024-12-18 DIAGNOSIS — F60.3 BORDERLINE PERSONALITY DISORDER: Primary | ICD-10-CM

## 2024-12-18 DIAGNOSIS — F90.2 ADHD (ATTENTION DEFICIT HYPERACTIVITY DISORDER), COMBINED TYPE: ICD-10-CM

## 2024-12-18 DIAGNOSIS — F41.1 GENERALIZED ANXIETY DISORDER: ICD-10-CM

## 2024-12-18 PROCEDURE — G2211 COMPLEX E/M VISIT ADD ON: HCPCS | Mod: S$GLB,,,

## 2024-12-18 PROCEDURE — 3078F DIAST BP <80 MM HG: CPT | Mod: CPTII,S$GLB,,

## 2024-12-18 PROCEDURE — 3044F HG A1C LEVEL LT 7.0%: CPT | Mod: CPTII,S$GLB,,

## 2024-12-18 PROCEDURE — 3075F SYST BP GE 130 - 139MM HG: CPT | Mod: CPTII,S$GLB,,

## 2024-12-18 PROCEDURE — 3008F BODY MASS INDEX DOCD: CPT | Mod: CPTII,S$GLB,,

## 2024-12-18 PROCEDURE — 99999 PR PBB SHADOW E&M-EST. PATIENT-LVL II: CPT | Mod: PBBFAC,,,

## 2024-12-18 PROCEDURE — 99215 OFFICE O/P EST HI 40 MIN: CPT | Mod: S$GLB,,,

## 2024-12-18 RX ORDER — DEXTROAMPHETAMINE SACCHARATE, AMPHETAMINE ASPARTATE, DEXTROAMPHETAMINE SULFATE AND AMPHETAMINE SULFATE 2.5; 2.5; 2.5; 2.5 MG/1; MG/1; MG/1; MG/1
10 TABLET ORAL DAILY PRN
Qty: 30 TABLET | Refills: 0 | Status: SHIPPED | OUTPATIENT
Start: 2024-12-18

## 2024-12-18 RX ORDER — DULOXETIN HYDROCHLORIDE 30 MG/1
30 CAPSULE, DELAYED RELEASE ORAL DAILY
Qty: 30 CAPSULE | Refills: 1 | Status: SHIPPED | OUTPATIENT
Start: 2024-12-18 | End: 2025-02-16

## 2024-12-18 NOTE — PROGRESS NOTES
"Outpatient Psychiatry Follow-Up Visit (PA)    12/19/2024    Clinical Status of Patient:  Outpatient (Ambulatory)    Chief Complaint:  Josephine Cruz is a 39 y.o. female who presents today for follow-up of depression, anxiety, and attention problems.  Met with patient.     Current Psych Medications:   NONE (stopped medication)    Interval History and Content of Current Session:  Patient seen and chart reviewed. Last seen on 10/30/2024    Patient has a psychiatric history of: depression, anxiety, and attention problems    Pt reports for follow up today stating that she stopped it within a day or two after seeing me. She was having "aggressive" dreams. States she is doing better since stopping Lexapro but still having lots of anxiety and sadness/intense emotions.    DSM-V ADHD Criteria:  (Bold the positive symptoms)  Part A - Inclusion: requires a pattern of behavior, with onset before age 12 years, that is present in multiple settings and gives rise to social, educational, or work performance difficulties. Persistent for at least 6 months to a degree inconsistent with developmental level. At least SIX of the follow symptoms of inattention:  Inattentive symptoms:  Often fails to give close attention to details or makes careless mistakes in schoolwork, at work, or during other activities (e.g., overlooks or misses details, work is inaccurate). NO  Often has difficulty sustaining attention in tasks or play activities (e.g., has difficulty remaining focused during lectures, conversations, or lengthy reading). NO  Often does not seem to listen when spoken to directly (e.g., mind seems elsewhere, even in the absence of any obvious distraction). YES  Often does not follow through on instructions and fails to finish schoolwork, chores, or duties in the workplace (e.g., starts tasks but quickly loses focus and is easily sidetracked). YES - schoolwork was always done at last minute or get distracted, homework, always " doing homework in homeroom the day it was due   Often has difficulty organizing tasks and activities (e.g., difficulty managing sequential tasks; difficulty keeping materials and belongings in order; messy, disorganized work; has poor time management; fails to meet deadlines). YES - procrastination  Often avoids, dislikes, or is reluctant to engage in tasks that require sustained mental effort (e.g., schoolwork or homework; for older adolescents and adults, preparing reports, completing forms, reviewing lengthy papers). NO  Often loses things necessary for tasks or activities (e.g., school materials, pencils, books, tools, wallets, keys, paperwork, eyeglasses, mobile telephones). YES - doesn't realize its lost until she needs it   Is often easily distracted by extraneous stimuli (for older adolescents and adults, may include unrelated thoughts). YES - gets easily distracted by little things during this interview  Is often forgetful in daily activities (e.g., doing chores, running errands; for older adolescents and adults, returning calls, paying bills, keeping appointments). YES        Part B - Inclusion: Alternatively, requires the presence of at least SIX of the following manifestations of hyperactivity and impulsivity over the same course as above in Part A:    Hyperactive symptoms:  Often fidgets with or taps hands or feet or squirms in seat. YES  Often leaves seat in situations when remaining seated is expected (e.g., leaves his or her place in the classroom, in the office or other workplace, or in other situations that require remaining in place). YES  Often runs about or climbs in situations where it is inappropriate. (Note: In adolescents or adults, may be limited to feeling restless). YES   Often unable to play or engage in leisure activities quietly. YES - talkative, but loved reading   Is often on the go, acting as if driven by a motor (e.g., is unable to be or uncomfortable being still for extended  "time, as in restaurants, meetings; may be experienced by others as being restless or difficult to keep up with) YES - her and brother were active  Often talks excessively. - YES  Often blurts out an answer before a question has been completed (e.g., completes people's sentences; cannot wait for turn in conversation). YES  Often has difficulty waiting his or her turn (e.g., while waiting in line) YES  Often interrupts or intrudes on others (e.g., butts into conversations, games, or activities; may start using other people's things without asking or receiving permission; for adolescents and adults, may intrude into or take over what others are doing) YES - brother would try to talk on the phone by himself in his bedroom and take the cord phone through door do intrude      Problem Area Examples of Impairment Frequency   Problems at school, work, or both  Very Often   Problems with family She feels that she has no patience with her own kids/, quick to lose it when they don't do the things that they are doing Very Often   Legal problems NO Never   Problems with home responsibilities Still has one room in house that she wont touch because its so overwhelming ( bedroom ) Very Often   Problems with social life Talks to much and impose, makes her feel like she has to back off, then rabbit holes by what leads to depression "they don't like me" Often   Problems with leisure activities Rarely can't focus on reading  Rarely   Problems of health or safety Eating habits, how much she eats not what she eats,   Doesn't see pedestrian but believes she looks both ways Very Often         Severity Rating   Minimal Mild Moderate Severe   No or little impairment Slight difficulty in 1-2 domains;  Still generally able to meet obligations More difficulty in 2-3 domains; More problems meeting obligations Significant in 3+ domains; Failure to meet obligations OR sx pose risk to self/others       ADHD report scale:  Part A: 12 Part B: " 35      Borderline Personality Disorder Characteristics/Criteria  Inclusion: requires a pervasive pattern of instability of interpersonal relationships, self-image, and affects, and marked impulsivity, as indicated by at least five of the following manifestations:    1) frantic efforts to avoid real or imagined abandonment (When you sense that someone close to you is going to abandon you, do you undertake emotional or even frantic efforts to keep them from leaving you?) YES - if you feel like they are doing it because of something you or they did, romantic relationships only     2) a pattern of unstable and intense interpersonal relationships characterized by alternating between extremes of idealization and devaluation (Are most of your close relationships intense and unstable? Do you alternate between feeling as though the people in your life are really good and really bad?) YES - recent friendship, went and get tattoos together, intense relationship, currently this person did something that wasn't nice to her, didn't feel need to apologize, works with her and sees her every day all day     3) identity disturbance: markedly and persistently unstable self-image or sense of self (Do you have a very unstable or poorly developed sense of who you are? Do you aspirations, goals, opinions, and values change suddenly and frequently?) YES - always have been good at every thing that she tries moves on to the next interest, looks at pictures and then makes her feel bad, then when she thinks about dieting she will self sabotage herself and then binge eats     4) impulsivity in at least two areas that are potentially self-damaging (e.g., spending, sex, Substance Abuse, reckless driving, binge eating) (Do you often act on the spur of the moment, without a plan or consideration for the outcome? Do you engage in dangerous, risky, and potentially self-damaging activities without regard to their consequences?) YES impulsive  spending, likes to drink started drinking around 29 yo, as a young adult/ older teenager liked to do sex in risky places    5) recurrent suicidal behavior, gestures, or threats, or self-mutilating behavior , parasuicidal (Do you frequently threaten to harm or kill yourself? Any attempts to hurt self?) YES - have attempted - young child, cut wrists, superficial, as teenager, took a lot of meds but knew that she wasn't killing herself, stopped herself, suicidal thoughts sometimes now     6) affective instability due to a marked reactivity of mood (e.g., intense episodic dysphoria, irritability, or anxiety usually lasting a few hours and only rarely more than a few days) (Are your emotions easily aroused or intense? Intense sadness, annoyance, or worry that usually only lasts a few hours and never more than a few days?) YES    7) chronic feelings of emptiness (Do you chronically feel empty?) YES - comes and goes     8) inappropriate, intense anger or difficulty controlling anger (e.g., frequent displays of temper, constant anger, recurrent physical fights) (Intense anger? frequently loses temper?) YES - loses temper easily     9) transient, stress-related paranoid ideation or severe dissociative symptoms ( At times of stress, do you ever feel like other people are conspiring against you or that you are an outside observer of your own mind, thoughts, feelings, and body?) YES - feels that her coworkers and leaving her out and conspiring to not be her friend    Review of Systems   PSYCHIATRIC: Pertinant items are noted in the narrative.  CONSTITUTIONAL: No weight gain or loss.   MUSCULOSKELETAL: No pain or stiffness of the joints.  NEUROLOGIC: No weakness, sensory changes, seizures, confusion, memory loss, tremor or other abnormal movements.  ENDOCRINE: No polydipsia or polyuria.  INTEGUMENTARY: No rashes or lacerations.  EYES: No exophthalmos, jaundice or blindness.  ENT: No dizziness, tinnitus or hearing  "loss.  RESPIRATORY: No shortness of breath.  CARDIOVASCULAR: No tachycardia or chest pain.  GASTROINTESTINAL: No nausea, vomiting, pain, constipation or diarrhea.  GENITOURINARY: No frequency, dysuria or sexual dysfunction.  HEMATOLOGIC/LYMPHATIC: No excessive bleeding, prolonged or excessive bleeding after dental extraction/injury.  ALLERGIC/IMMUNOLOGIC: No allergic response to materials, foods or animals at this time.    Past Medication Trials:    Past Medical, Family and Social History: The patient's past medical, family and social history have been reviewed and updated as appropriate within the electronic medical record - see encounter notes.    Compliance: yes    Side effects: None    Risk Parameters:  Patient reports no suicidal ideation  Patient reports no homicidal ideation  Patient reports no self-injurious behavior  Patient reports no violent behavior    Exam (detailed: at least 9 elements; comprehensive: all 15 elements)   Constitutional  Vitals:  Most recent vital signs, dated less than 90 days prior to this appointment, were reviewed.   Vitals:    12/18/24 1303   BP: 131/79   Pulse: 81   Weight: 67.8 kg (149 lb 5.8 oz)   Height: 5' 5" (1.651 m)        General:  unremarkable, age appropriate, casually dressed     Musculoskeletal  Muscle Strength/Tone:  no spasicity, no rigidity, no cogwheeling, no flaccidity   Gait & Station:  non-ataxic     Psychiatric  Speech:  no latency; no press   Mood & Affect:  steady, happy, euthymic  congruent and appropriate   Thought Process:  normal and logical   Associations:  intact   Thought Content:  normal, no suicidality, no homicidality, delusions, or paranoia   Insight:  limited awareness of illness   Judgement: behavior is adequate to circumstances   Orientation:  grossly intact, person, place, situation, time/date, day of week, month of year, year   Memory: intact for content of interview, memory >3 objects at five mins, able to remember recent events- Yes, able to " remember remote events- Yes   Language: grossly intact, able to name, able to repeat   Attention Span & Concentration:  distracted   Fund of Knowledge:  intact and appropriate to age and level of education, familiar with aspects of current personal life     Assessment and Diagnosis   Status/Progress: Based on the examination today, the patient's problem(s) is/are  okay right now without medication .  New problems have not been presented today.   Co-morbidities are not complicating management of the primary condition.  There are no active rule-out diagnoses for this patient at this time.     General Impression: Josephine Cruz is a 40 yo female who presents to the clinic today for follow up and to continue with evaluations. Patient meets criteria for BPD and ADHD combined type. Will start Cymbalta 30 mg PO daily for anxiety/depression symptoms as well as for chronic back pain and Adderall 10 mg PO PRN daily.       ICD-10-CM ICD-9-CM    1. Borderline personality disorder  F60.3 301.83 DULoxetine (CYMBALTA) 30 MG capsule      2. Obsessive compulsive personality disorder  F60.5 301.4 DULoxetine (CYMBALTA) 30 MG capsule      3. Generalized anxiety disorder  F41.1 300.02 DULoxetine (CYMBALTA) 30 MG capsule      4. ADHD (attention deficit hyperactivity disorder), combined type  F90.2 314.01 dextroamphetamine-amphetamine (ADDERALL) 10 mg Tab          Intervention/Counseling/Treatment Plan   Medication Management: Consider adding an antidepressants  Start Cymbalta 30 mg PO daily   Start Adderall 10 mg PO daily   checked, no discrepancies  Patient has no contraindications: no h/o allergic rxn, agitation, anxiety, tourette's, arrythmia, cardiovascular disease, cardiac structural abnormalities, hyperthyroidism, glaucoma, Other psychiatric illness, etc.   Discussed diagnosis, risks and benefits of proposed treatment vs alternative treatments vs no treatment, and potential side effects of these treatments (dependency,  HTN, MI,  stroke, arrythmia, anaphylaxis or other allergic reactions, nervousness, anorexia, insomnia, tachycardia, palpitations, dizziness, BP changes, HR changes,  etc.). The patient expresses understanding of the above and displays the capacity to agree with this treatment given said understanding. Patient also agrees that, currently, the benefits outweigh the risks and would like to pursue treatment at this time.  Discussed diagnosis, risk and benefits of proposed treatment above vs alternative treatment vs no treatment, and potential side effects of these treatments, and the inherent unpredictability of individual responses to these treatments. The patient expresses understanding and gives informed consent to pursue treatment at this time, believing that the potential benefits outweigh the potential risks. Patient has no other questions. Risks/adverse effects at this time include but are not limited to: GI side effects, sexual dysfunction, activation vs sedation, triggering of suicidal ideation, and serotonin syndrome.   Patient voices understanding and agreement with this plan  Provided crisis numbers  Encouraged patient to keep future appointments  Instruct patient to call or message with questions  In the event of an emergency, including suicidal ideation, patient was advised to go to the emergency room    Return to Clinic: 6 weeks, 2 months    Total time: 70 minutes (which included pts differential diagnosis and prognosis for psychiatric conditions, risks, benefits of treatments, instructions and adherence to treatment plan, risk reduction, reviewing current psychiatric medication regimen, medical problems and social stressors. In addtion to possible discussion with other healthcare provider/s)    Add on Psychotherapy time: 10 minutes    Natty Zapien PA-C

## 2024-12-19 PROBLEM — F60.3 BORDERLINE PERSONALITY DISORDER: Status: ACTIVE | Noted: 2024-12-19

## 2024-12-19 PROBLEM — F60.5 OBSESSIVE COMPULSIVE PERSONALITY DISORDER: Status: ACTIVE | Noted: 2024-12-19

## 2024-12-19 PROBLEM — F41.1 GENERALIZED ANXIETY DISORDER: Status: ACTIVE | Noted: 2024-12-19

## 2024-12-19 PROBLEM — F90.2 ADHD (ATTENTION DEFICIT HYPERACTIVITY DISORDER), COMBINED TYPE: Status: ACTIVE | Noted: 2024-12-19

## 2024-12-20 ENCOUNTER — PATIENT MESSAGE (OUTPATIENT)
Dept: PSYCHIATRY | Facility: CLINIC | Age: 39
End: 2024-12-20
Payer: COMMERCIAL

## 2025-01-07 RX ORDER — ESCITALOPRAM OXALATE 10 MG/1
10 TABLET ORAL
Qty: 90 TABLET | Refills: 1 | Status: SHIPPED | OUTPATIENT
Start: 2025-01-07

## 2025-01-07 NOTE — TELEPHONE ENCOUNTER
Refill Routing Note   Medication(s) are not appropriate for processing by [facility] for the following reason(s):        New or recently adjusted medication  Clarification of medication (Rx) details-started 10/1/24, but pt repoerted not taking 11/21/24    [facility] action(s):  Defer        Medication Therapy Plan: Patient not taking: Reported on 11/21/2024      Appointments  past 12m or future 3m with PCP    Date Provider   Last Visit   10/1/2024 Violette Alvarez MD   Next Visit   Visit date not found Violette Alvarez MD   ED visits in past 90 days: 0        Note composed:2:23 PM 01/07/2025

## 2025-01-07 NOTE — TELEPHONE ENCOUNTER
No care due was identified.  [facility] Embedded Care Due Messages. Reference number: [id number].   1/07/2025 8:07:42 AM CST

## 2025-01-17 DIAGNOSIS — F60.5 OBSESSIVE COMPULSIVE PERSONALITY DISORDER: ICD-10-CM

## 2025-01-17 DIAGNOSIS — F41.1 GENERALIZED ANXIETY DISORDER: ICD-10-CM

## 2025-01-17 DIAGNOSIS — F60.3 BORDERLINE PERSONALITY DISORDER: ICD-10-CM

## 2025-01-17 RX ORDER — DULOXETIN HYDROCHLORIDE 30 MG/1
CAPSULE, DELAYED RELEASE ORAL
Qty: 30 CAPSULE | Refills: 1 | Status: SHIPPED | OUTPATIENT
Start: 2025-01-17

## 2025-01-23 DIAGNOSIS — F90.2 ADHD (ATTENTION DEFICIT HYPERACTIVITY DISORDER), COMBINED TYPE: ICD-10-CM

## 2025-01-23 NOTE — TELEPHONE ENCOUNTER
No care due was identified.  [facility] Embedded Care Due Messages. Reference number: [id number].   1/23/2025 5:31:30 PM CST

## 2025-01-24 RX ORDER — VALACYCLOVIR HYDROCHLORIDE 1 G/1
TABLET, FILM COATED ORAL
Qty: 28 TABLET | Refills: 1 | Status: SHIPPED | OUTPATIENT
Start: 2025-01-24

## 2025-01-24 RX ORDER — DEXTROAMPHETAMINE SACCHARATE, AMPHETAMINE ASPARTATE, DEXTROAMPHETAMINE SULFATE AND AMPHETAMINE SULFATE 2.5; 2.5; 2.5; 2.5 MG/1; MG/1; MG/1; MG/1
10 TABLET ORAL DAILY PRN
Qty: 30 TABLET | Refills: 0 | Status: SHIPPED | OUTPATIENT
Start: 2025-01-24

## 2025-02-15 DIAGNOSIS — F41.1 GENERALIZED ANXIETY DISORDER: ICD-10-CM

## 2025-02-15 DIAGNOSIS — F60.3 BORDERLINE PERSONALITY DISORDER: ICD-10-CM

## 2025-02-15 DIAGNOSIS — F60.5 OBSESSIVE COMPULSIVE PERSONALITY DISORDER: ICD-10-CM

## 2025-02-17 RX ORDER — DULOXETIN HYDROCHLORIDE 30 MG/1
30 CAPSULE, DELAYED RELEASE ORAL DAILY
Qty: 30 CAPSULE | Refills: 1 | Status: SHIPPED | OUTPATIENT
Start: 2025-02-17

## 2025-02-18 ENCOUNTER — OFFICE VISIT (OUTPATIENT)
Dept: PSYCHIATRY | Facility: CLINIC | Age: 40
End: 2025-02-18
Payer: COMMERCIAL

## 2025-02-18 VITALS
WEIGHT: 143.75 LBS | SYSTOLIC BLOOD PRESSURE: 136 MMHG | DIASTOLIC BLOOD PRESSURE: 85 MMHG | BODY MASS INDEX: 23.92 KG/M2 | HEART RATE: 71 BPM

## 2025-02-18 DIAGNOSIS — F60.5 OBSESSIVE COMPULSIVE PERSONALITY DISORDER: ICD-10-CM

## 2025-02-18 DIAGNOSIS — F90.2 ADHD (ATTENTION DEFICIT HYPERACTIVITY DISORDER), COMBINED TYPE: Primary | ICD-10-CM

## 2025-02-18 DIAGNOSIS — F60.3 BORDERLINE PERSONALITY DISORDER: ICD-10-CM

## 2025-02-18 DIAGNOSIS — F41.1 GENERALIZED ANXIETY DISORDER: ICD-10-CM

## 2025-02-18 RX ORDER — DEXTROAMPHETAMINE SACCHARATE, AMPHETAMINE ASPARTATE, DEXTROAMPHETAMINE SULFATE AND AMPHETAMINE SULFATE 2.5; 2.5; 2.5; 2.5 MG/1; MG/1; MG/1; MG/1
10 TABLET ORAL 2 TIMES DAILY PRN
Qty: 60 TABLET | Refills: 0 | Status: SHIPPED | OUTPATIENT
Start: 2025-03-18

## 2025-02-18 RX ORDER — DEXTROAMPHETAMINE SACCHARATE, AMPHETAMINE ASPARTATE, DEXTROAMPHETAMINE SULFATE AND AMPHETAMINE SULFATE 2.5; 2.5; 2.5; 2.5 MG/1; MG/1; MG/1; MG/1
10 TABLET ORAL 2 TIMES DAILY PRN
Qty: 60 TABLET | Refills: 0 | Status: SHIPPED | OUTPATIENT
Start: 2025-02-18

## 2025-02-18 NOTE — PROGRESS NOTES
"Outpatient Psychiatry Follow-Up Visit (PA)    02/18/2025    Clinical Status of Patient:  Outpatient (Ambulatory)    Chief Complaint:  Josephine Cruz is a 39 y.o. female who presents today for follow-up of depression, anxiety, and attention problems.  Met with patient.     Current Psych Medications:   Cymbalta 30 mg PO daily   Adderall IR 10 mg PO daily     Interval History and Content of Current Session:  Patient seen and chart reviewed. Last seen on 12/18/2024    Patient has a psychiatric history of: depression, anxiety and ADHD    Pt reports for follow up today stating that she is doing well but her coworkers noticed her clinching her teeth and jaw. Drinking red bull for caffeine almost every day.    Mood: overall "great"    Anxiety: much better, has a lot of things to be stressed about but not as anxious about it, still some things to work on     Pt appears happy and calm    Denies adverse effects from medication    Sleep: at first she was having insomnia, the first week, sleeping a lot better now    Appetite: decreased appetite, eating smaller portions when she eats, not craving snacks    Denies SI/HI/AVH.     Pt reports taking medications as prescribed and behaving appropriately during interview today.    Review of Systems   PSYCHIATRIC: Pertinant items are noted in the narrative.  CONSTITUTIONAL: No weight gain or loss.   MUSCULOSKELETAL: No pain or stiffness of the joints.  NEUROLOGIC: No weakness, sensory changes, seizures, confusion, memory loss, tremor or other abnormal movements.  ENDOCRINE: No polydipsia or polyuria.  INTEGUMENTARY: No rashes or lacerations.  EYES: No exophthalmos, jaundice or blindness.  ENT: No dizziness, tinnitus or hearing loss.  RESPIRATORY: No shortness of breath.  CARDIOVASCULAR: No tachycardia or chest pain.  GASTROINTESTINAL: No nausea, vomiting, pain, constipation or diarrhea.  GENITOURINARY: No frequency, dysuria or sexual dysfunction.  HEMATOLOGIC/LYMPHATIC: No " excessive bleeding, prolonged or excessive bleeding after dental extraction/injury.  ALLERGIC/IMMUNOLOGIC: No allergic response to materials, foods or animals at this time.    Past Medication Trials:  Zoloft   Lexapro    Past Medical, Family and Social History: The patient's past medical, family and social history have been reviewed and updated as appropriate within the electronic medical record - see encounter notes.    Compliance: yes    Side effects: None    Risk Parameters:  Patient reports no suicidal ideation  Patient reports no homicidal ideation  Patient reports no self-injurious behavior  Patient reports no violent behavior    Exam (detailed: at least 9 elements; comprehensive: all 15 elements)   Constitutional  Vitals:  Most recent vital signs, dated less than 90 days prior to this appointment, were reviewed.   Vitals:    02/18/25 1353   BP: 136/85   Pulse: 71   Weight: 65.2 kg (143 lb 11.8 oz)        General:  unremarkable, age appropriate, casually dressed     Musculoskeletal  Muscle Strength/Tone:  no spasicity, no rigidity, no cogwheeling, no flaccidity   Gait & Station:  non-ataxic     Psychiatric  Speech:  no latency; no press   Mood & Affect:  steady, happy, euthymic  congruent and appropriate   Thought Process:  normal and logical   Associations:  intact   Thought Content:  normal, no suicidality, no homicidality, delusions, or paranoia   Insight:  limited awareness of illness   Judgement: behavior is adequate to circumstances   Orientation:  grossly intact, person, place, situation, time/date, day of week, month of year, year   Memory: intact for content of interview, memory >3 objects at five mins, able to remember recent events- Yes, able to remember remote events- Yes   Language: grossly intact, able to name, able to repeat   Attention Span & Concentration:  distracted   Fund of Knowledge:  intact and appropriate to age and level of education, familiar with aspects of current personal life      Assessment and Diagnosis   Status/Progress: Based on the examination today, the patient's problem(s) is/are  okay right now without medication .  New problems have not been presented today.   Co-morbidities are not complicating management of the primary condition.  There are no active rule-out diagnoses for this patient at this time.     General Impression: Josephine Cruz is a 38 yo female who presents to the clinic today for follow up and to continue with evaluations. Patient meets criteria for BPD and ADHD combined type. Will continue Cymbalta 30 mg PO daily for anxiety/depression symptoms as well as for chronic back pain and increase Adderall 10 mg PO PRN BID.      ICD-10-CM ICD-9-CM    1. ADHD (attention deficit hyperactivity disorder), combined type  F90.2 314.01 dextroamphetamine-amphetamine (ADDERALL) 10 mg Tab      dextroamphetamine-amphetamine (ADDERALL) 10 mg Tab      2. Obsessive compulsive personality disorder  F60.5 301.4       3. Generalized anxiety disorder  F41.1 300.02       4. Borderline personality disorder  F60.3 301.83             Intervention/Counseling/Treatment Plan   Medication Management: Continue current medications.  Continue Cymbalta 30 mg PO daily   Increase Adderall 10 mg PO PRN BID   checked, no discrepancies  Patient has no contraindications: no h/o allergic rxn, agitation, anxiety, tourette's, arrythmia, cardiovascular disease, cardiac structural abnormalities, hyperthyroidism, glaucoma, Other psychiatric illness, etc.   Discussed diagnosis, risks and benefits of proposed treatment vs alternative treatments vs no treatment, and potential side effects of these treatments (dependency,  HTN, MI, stroke, arrythmia, anaphylaxis or other allergic reactions, nervousness, anorexia, insomnia, tachycardia, palpitations, dizziness, BP changes, HR changes,  etc.). The patient expresses understanding of the above and displays the capacity to agree with this treatment given said  understanding. Patient also agrees that, currently, the benefits outweigh the risks and would like to pursue treatment at this time.  Discussed diagnosis, risk and benefits of proposed treatment above vs alternative treatment vs no treatment, and potential side effects of these treatments, and the inherent unpredictability of individual responses to these treatments. The patient expresses understanding and gives informed consent to pursue treatment at this time, believing that the potential benefits outweigh the potential risks. Patient has no other questions. Risks/adverse effects at this time include but are not limited to: GI side effects, sexual dysfunction, activation vs sedation, triggering of suicidal ideation, and serotonin syndrome.   Patient voices understanding and agreement with this plan  Provided crisis numbers  Encouraged patient to keep future appointments  Instruct patient to call or message with questions  In the event of an emergency, including suicidal ideation, patient was advised to go to the emergency room    Return to Clinic: 2 months    Total time: 15 minutes (which included pts differential diagnosis and prognosis for psychiatric conditions, risks, benefits of treatments, instructions and adherence to treatment plan, risk reduction, reviewing current psychiatric medication regimen, medical problems and social stressors. In addtion to possible discussion with other healthcare provider/s)    Add on Psychotherapy time: 10 minutes    Natty Zapien PA-C

## 2025-03-14 ENCOUNTER — OFFICE VISIT (OUTPATIENT)
Dept: DERMATOLOGY | Facility: CLINIC | Age: 40
End: 2025-03-14
Payer: COMMERCIAL

## 2025-03-14 DIAGNOSIS — D48.5 NEOPLASM OF UNCERTAIN BEHAVIOR OF SKIN: Primary | ICD-10-CM

## 2025-03-14 DIAGNOSIS — L82.0 SEBORRHEIC KERATOSES, INFLAMED: ICD-10-CM

## 2025-03-14 DIAGNOSIS — B08.1 MOLLUSCA CONTAGIOSA: ICD-10-CM

## 2025-03-14 PROCEDURE — 99999 PR PBB SHADOW E&M-EST. PATIENT-LVL III: CPT | Mod: PBBFAC,,, | Performed by: STUDENT IN AN ORGANIZED HEALTH CARE EDUCATION/TRAINING PROGRAM

## 2025-03-14 NOTE — PROGRESS NOTES
Subjective:      Patient ID:  Josephine Cruz is a 39 y.o. female who presents for   Chief Complaint   Patient presents with    Lesion     Around neck      Pt is here for lesions on neck      Review of Systems   Skin:  Positive for itching and dry skin.       Objective:   Physical Exam   Constitutional: She appears well-developed and well-nourished. No distress.   Neurological: She is alert and oriented to person, place, and time. She is not disoriented.   Psychiatric: She has a normal mood and affect.   Skin:   Areas Examined (abnormalities noted in diagram):   Neck Inspection Performed  Back Inspection Performed                 Diagram Legend     Erythematous scaling macule/papule c/w actinic keratosis       Vascular papule c/w angioma      Pigmented verrucoid papule/plaque c/w seborrheic keratosis      Yellow umbilicated papule c/w sebaceous hyperplasia      Irregularly shaped tan macule c/w lentigo     1-2 mm smooth white papules consistent with Milia      Movable subcutaneous cyst with punctum c/w epidermal inclusion cyst      Subcutaneous movable cyst c/w pilar cyst      Firm pink to brown papule c/w dermatofibroma      Pedunculated fleshy papule(s) c/w skin tag(s)      Evenly pigmented macule c/w junctional nevus     Mildly variegated pigmented, slightly irregular-bordered macule c/w mildly atypical nevus      Flesh colored to evenly pigmented papule c/w intradermal nevus       Pink pearly papule/plaque c/w basal cell carcinoma      Erythematous hyperkeratotic cursted plaque c/w SCC      Surgical scar with no sign of skin cancer recurrence      Open and closed comedones      Inflammatory papules and pustules      Verrucoid papule consistent consistent with wart     Erythematous eczematous patches and plaques     Dystrophic onycholytic nail with subungual debris c/w onychomycosis     Umbilicated papule    Erythematous-base heme-crusted tan verrucoid plaque consistent with inflamed seborrheic keratosis      Erythematous Silvery Scaling Plaque c/w Psoriasis     See annotation                Assessment / Plan:      Pathology Orders:       Normal Orders This Visit    Specimen to Pathology, Dermatology     Questions:    Procedure Type: Dermatology and skin neoplasms    Number of Specimens: 3    ------------------------: -------------------------    Spec 1 Procedure: Biopsy    Spec 1 Clinical Impression: inflamed IDN, tag, molluscum    Spec 1 Source: right neck    ------------------------: -------------------------    Spec 2 Procedure: Biopsy    Spec 2 Clinical Impression: inflamed IDN, tag, molluscum    Spec 2 Source: left neck    ------------------------: -------------------------    Spec 3 Procedure: Biopsy    Spec 3 Clinical Impression: inflamed IDN, tag, molluscum    Spec 3 Source: posterior neck    Release to patient: Immediate    Release to patient:           Neoplasm of uncertain behavior of skin  -     Specimen to Pathology, Dermatology    Shave biopsy procedure note x 3    Shave biopsy performed after verbal consent including risk of infection, scar, recurrence, need for additional treatment of site. Area prepped with alcohol, anesthetized with approximately 1.0cc of 1% lidocaine with epinephrine. Lesional tissue shaved with razor blade. Hemostasis achieved with application of aluminum chloride followed by hyfrecation. No complications. Dressing applied. Wound care explained.      Seborrheic keratoses, inflamed  - lesion was painful.    Cryosurgery procedure note:    Verbal consent from the patient is obtained including, but not limited to, risk of hypopigmentation/hyperpigmentation, scar, recurrence of lesion. Liquid nitrogen cryosurgery is applied to 2 lesions to produce a freeze injury. The patient is aware that blisters may form and is instructed on wound care with gentle cleansing and use of vaseline ointment to keep moist until healed. The patient is supplied a handout on cryosurgery and is instructed to  call if lesions do not completely resolve.    Mollusca contagiosa  - pearly and irritated papules on neck. Ddx idn, inflamed molluscum.    Several treated with shave. One with cryo    Cryosurgery procedure note:    Verbal consent from the patient is obtained including, but not limited to, risk of hypopigmentation/hyperpigmentation, scar, recurrence of lesion. Liquid nitrogen cryosurgery is applied to 1 lesions to produce a freeze injury. The patient is aware that blisters may form and is instructed on wound care with gentle cleansing and use of vaseline ointment to keep moist until healed. The patient is supplied a handout on cryosurgery and is instructed to call if lesions do not completely resolve.         Follow up if symptoms worsen or fail to improve.

## 2025-03-14 NOTE — PATIENT INSTRUCTIONS
Shave Biopsy Wound Care    Your doctor has performed a shave biopsy today.  A band aid and vaseline ointment has been placed over the site.  This should remain in place for NO LONGER THAN 48 hours.  It is fine to remove the bandaid after 24 hours, if the area is no longer bleeding. It is recommended that you keep the area dry (do not wet)) for the first 24 hours.  After 24 hours, wash the area with warm soap and water and apply Vaseline jelly.  Many patients prefer to use Neosporin or Bacitracin ointment.  This is acceptable; however, know that you can develop an allergy to this medication even if you have used it safely for years.  It is important to keep the area moist.  Letting it dry out and get air slows healing time, and will worsen the scar.        If you notice increasing redness, tenderness, pain, or yellow drainage at the biopsy site, please notify your doctor.  These are signs of an infection.    If your biopsy site is bleeding, apply firm pressure for 15 minutes straight.  Repeat for another 15 minutes, if it is still bleeding.   If the surgical site continues to bleed, then please contact your doctor.      For [facility] users:   You will receive your biopsy results in [facility] as soon as they are available. Please be assured that your physician/provider will review your results and will then determine what further treatment, evaluation, or planning is required. You should be contacted by your physician's/provider's office within 5 business days of receiving your results; If not, please reach out to directly. This is one more way [employer] is putting you first.     [facility] [place] [address]/ [phone] FAX/ [email or web address]

## 2025-03-24 ENCOUNTER — RESULTS FOLLOW-UP (OUTPATIENT)
Dept: DERMATOLOGY | Facility: CLINIC | Age: 40
End: 2025-03-24

## 2025-04-09 ENCOUNTER — PATIENT MESSAGE (OUTPATIENT)
Dept: PSYCHIATRY | Facility: CLINIC | Age: 40
End: 2025-04-09
Payer: COMMERCIAL

## 2025-04-15 DIAGNOSIS — F60.5 OBSESSIVE COMPULSIVE PERSONALITY DISORDER: ICD-10-CM

## 2025-04-15 DIAGNOSIS — F41.1 GENERALIZED ANXIETY DISORDER: ICD-10-CM

## 2025-04-15 DIAGNOSIS — F60.3 BORDERLINE PERSONALITY DISORDER: ICD-10-CM

## 2025-04-15 RX ORDER — DULOXETIN HYDROCHLORIDE 30 MG/1
CAPSULE, DELAYED RELEASE ORAL
Qty: 30 CAPSULE | Refills: 1 | Status: SHIPPED | OUTPATIENT
Start: 2025-04-15

## 2025-04-18 ENCOUNTER — PATIENT MESSAGE (OUTPATIENT)
Dept: INTERNAL MEDICINE | Facility: CLINIC | Age: 40
End: 2025-04-18
Payer: COMMERCIAL

## 2025-04-29 ENCOUNTER — PATIENT MESSAGE (OUTPATIENT)
Dept: PSYCHIATRY | Facility: CLINIC | Age: 40
End: 2025-04-29
Payer: COMMERCIAL

## 2025-05-17 DIAGNOSIS — F90.2 ADHD (ATTENTION DEFICIT HYPERACTIVITY DISORDER), COMBINED TYPE: ICD-10-CM

## 2025-05-19 RX ORDER — DEXTROAMPHETAMINE SACCHARATE, AMPHETAMINE ASPARTATE, DEXTROAMPHETAMINE SULFATE AND AMPHETAMINE SULFATE 2.5; 2.5; 2.5; 2.5 MG/1; MG/1; MG/1; MG/1
10 TABLET ORAL 2 TIMES DAILY PRN
Qty: 60 TABLET | Refills: 0 | Status: SHIPPED | OUTPATIENT
Start: 2025-05-19

## 2025-06-26 DIAGNOSIS — F90.2 ADHD (ATTENTION DEFICIT HYPERACTIVITY DISORDER), COMBINED TYPE: Primary | ICD-10-CM

## 2025-06-27 RX ORDER — DEXTROAMPHETAMINE SACCHARATE, AMPHETAMINE ASPARTATE, DEXTROAMPHETAMINE SULFATE AND AMPHETAMINE SULFATE 2.5; 2.5; 2.5; 2.5 MG/1; MG/1; MG/1; MG/1
10 TABLET ORAL 2 TIMES DAILY PRN
Qty: 60 TABLET | Refills: 0 | OUTPATIENT
Start: 2025-06-27

## 2025-07-02 DIAGNOSIS — Z12.31 OTHER SCREENING MAMMOGRAM: ICD-10-CM

## 2025-07-02 RX ORDER — DEXTROAMPHETAMINE SACCHARATE, AMPHETAMINE ASPARTATE, DEXTROAMPHETAMINE SULFATE AND AMPHETAMINE SULFATE 2.5; 2.5; 2.5; 2.5 MG/1; MG/1; MG/1; MG/1
10 TABLET ORAL 2 TIMES DAILY PRN
Qty: 60 TABLET | Refills: 0 | Status: SHIPPED | OUTPATIENT
Start: 2025-07-30

## 2025-07-02 RX ORDER — DEXTROAMPHETAMINE SACCHARATE, AMPHETAMINE ASPARTATE, DEXTROAMPHETAMINE SULFATE AND AMPHETAMINE SULFATE 2.5; 2.5; 2.5; 2.5 MG/1; MG/1; MG/1; MG/1
10 TABLET ORAL 2 TIMES DAILY PRN
Qty: 60 TABLET | Refills: 0 | Status: SHIPPED | OUTPATIENT
Start: 2025-07-02

## 2025-07-08 ENCOUNTER — HOSPITAL ENCOUNTER (OUTPATIENT)
Dept: RADIOLOGY | Facility: HOSPITAL | Age: 40
Discharge: HOME | End: 2025-07-08
Attending: INTERNAL MEDICINE
Payer: COMMERCIAL

## 2025-07-08 VITALS — HEIGHT: 65 IN | BODY MASS INDEX: 23.82 KG/M2 | WEIGHT: 143 LBS

## 2025-07-08 DIAGNOSIS — Z12.31 OTHER SCREENING MAMMOGRAM: ICD-10-CM

## 2025-07-08 PROCEDURE — 77067 SCR MAMMO BI INCL CAD: CPT | Mod: TC

## 2025-07-10 ENCOUNTER — PATIENT MESSAGE (OUTPATIENT)
Dept: PSYCHIATRY | Facility: CLINIC | Age: 40
End: 2025-07-10
Payer: COMMERCIAL

## 2025-07-17 DIAGNOSIS — F60.3 BORDERLINE PERSONALITY DISORDER: ICD-10-CM

## 2025-07-17 DIAGNOSIS — F60.5 OBSESSIVE COMPULSIVE PERSONALITY DISORDER: ICD-10-CM

## 2025-07-17 DIAGNOSIS — F41.1 GENERALIZED ANXIETY DISORDER: ICD-10-CM

## 2025-07-17 RX ORDER — DULOXETIN HYDROCHLORIDE 30 MG/1
30 CAPSULE, DELAYED RELEASE ORAL DAILY
Qty: 30 CAPSULE | Refills: 1 | Status: SHIPPED | OUTPATIENT
Start: 2025-07-17

## 2025-08-12 ENCOUNTER — PATIENT MESSAGE (OUTPATIENT)
Dept: PSYCHIATRY | Facility: CLINIC | Age: 40
End: 2025-08-12
Payer: COMMERCIAL

## 2025-08-12 ENCOUNTER — OFFICE VISIT (OUTPATIENT)
Dept: PSYCHIATRY | Facility: CLINIC | Age: 40
End: 2025-08-12
Payer: COMMERCIAL

## 2025-08-12 DIAGNOSIS — F60.5 OBSESSIVE COMPULSIVE PERSONALITY DISORDER: Primary | ICD-10-CM

## 2025-08-12 DIAGNOSIS — F60.3 BORDERLINE PERSONALITY DISORDER: ICD-10-CM

## 2025-08-12 DIAGNOSIS — F41.1 GENERALIZED ANXIETY DISORDER: ICD-10-CM

## 2025-08-12 DIAGNOSIS — F90.2 ADHD (ATTENTION DEFICIT HYPERACTIVITY DISORDER), COMBINED TYPE: ICD-10-CM

## 2025-08-12 PROCEDURE — 98006 SYNCH AUDIO-VIDEO EST MOD 30: CPT | Mod: 95,,,

## 2025-08-12 RX ORDER — DEXTROAMPHETAMINE SACCHARATE, AMPHETAMINE ASPARTATE, DEXTROAMPHETAMINE SULFATE AND AMPHETAMINE SULFATE 5; 5; 5; 5 MG/1; MG/1; MG/1; MG/1
1 TABLET ORAL DAILY
Qty: 30 TABLET | Refills: 0 | Status: SHIPPED | OUTPATIENT
Start: 2025-09-09

## 2025-08-12 RX ORDER — DEXTROAMPHETAMINE SACCHARATE, AMPHETAMINE ASPARTATE, DEXTROAMPHETAMINE SULFATE AND AMPHETAMINE SULFATE 5; 5; 5; 5 MG/1; MG/1; MG/1; MG/1
1 TABLET ORAL DAILY
Qty: 30 TABLET | Refills: 0 | Status: SHIPPED | OUTPATIENT
Start: 2025-08-12

## 2025-08-12 RX ORDER — FLUOXETINE 10 MG/1
10 CAPSULE ORAL DAILY
Qty: 30 CAPSULE | Refills: 2 | Status: SHIPPED | OUTPATIENT
Start: 2025-08-12 | End: 2025-11-10